# Patient Record
Sex: MALE | Race: WHITE | Employment: FULL TIME | URBAN - NONMETROPOLITAN AREA
[De-identification: names, ages, dates, MRNs, and addresses within clinical notes are randomized per-mention and may not be internally consistent; named-entity substitution may affect disease eponyms.]

---

## 2021-12-15 ENCOUNTER — HOSPITAL ENCOUNTER (EMERGENCY)
Age: 21
Discharge: HOME | End: 2021-12-15
Payer: COMMERCIAL

## 2021-12-15 VITALS
SYSTOLIC BLOOD PRESSURE: 112 MMHG | BODY MASS INDEX: 23.8 KG/M2 | HEART RATE: 97 BPM | HEIGHT: 71 IN | WEIGHT: 170 LBS | DIASTOLIC BLOOD PRESSURE: 58 MMHG | TEMPERATURE: 97.7 F | OXYGEN SATURATION: 96 % | RESPIRATION RATE: 18 BRPM

## 2021-12-15 DIAGNOSIS — J02.9 ACUTE PHARYNGITIS, UNSPECIFIED ETIOLOGY: Primary | ICD-10-CM

## 2021-12-15 DIAGNOSIS — Z20.822 COVID-19 RULED OUT BY LABORATORY TESTING: ICD-10-CM

## 2021-12-15 LAB
GROUP A STREP CULTURE, REFLEX: NEGATIVE
REFLEX THROAT C + S: NORMAL
SARS-COV-2, NAA: NOT  DETECTED

## 2021-12-15 PROCEDURE — 87635 SARS-COV-2 COVID-19 AMP PRB: CPT

## 2021-12-15 PROCEDURE — 99202 OFFICE O/P NEW SF 15 MIN: CPT | Performed by: NURSE PRACTITIONER

## 2021-12-15 PROCEDURE — 87880 STREP A ASSAY W/OPTIC: CPT

## 2021-12-15 PROCEDURE — 99203 OFFICE O/P NEW LOW 30 MIN: CPT

## 2021-12-15 PROCEDURE — 87070 CULTURE OTHR SPECIMN AEROBIC: CPT

## 2021-12-15 RX ORDER — CALCIUM CARBONATE 260MG(650)
TABLET,CHEWABLE ORAL
Refills: 0 | COMMUNITY
Start: 2021-12-15 | End: 2022-03-12

## 2021-12-15 RX ORDER — ASCORBIC ACID/MULTIVIT-MIN 1000 MG
EFFERVESCENT POWDER IN PACKET ORAL
Refills: 0 | COMMUNITY
Start: 2021-12-15 | End: 2022-03-12

## 2021-12-15 RX ORDER — DIPHENHYDRAMINE HYDROCHLORIDE AND LIDOCAINE HYDROCHLORIDE AND ALUMINUM HYDROXIDE AND MAGNESIUM HYDRO
5 KIT 4 TIMES DAILY PRN
Qty: 120 ML | Refills: 0 | Status: SHIPPED | OUTPATIENT
Start: 2021-12-15 | End: 2022-03-12

## 2021-12-15 RX ORDER — ACETAMINOPHEN 325 MG/1
650 TABLET ORAL EVERY 6 HOURS PRN
Qty: 120 TABLET | Refills: 3 | COMMUNITY
Start: 2021-12-15 | End: 2022-09-20

## 2021-12-15 RX ORDER — BROMPHENIRAMINE MALEATE, PSEUDOEPHEDRINE HYDROCHLORIDE, AND DEXTROMETHORPHAN HYDROBROMIDE 2; 30; 10 MG/5ML; MG/5ML; MG/5ML
5 SYRUP ORAL 4 TIMES DAILY PRN
Qty: 65 ML | Refills: 0 | Status: SHIPPED | OUTPATIENT
Start: 2021-12-15 | End: 2022-03-12

## 2021-12-15 ASSESSMENT — ENCOUNTER SYMPTOMS
CHOKING: 0
STRIDOR: 0
SHORTNESS OF BREATH: 0
APNEA: 0
CHEST TIGHTNESS: 0
SWOLLEN GLANDS: 0
NAUSEA: 0
DIARRHEA: 0
VOMITING: 0
SINUS PAIN: 0
SORE THROAT: 1
COUGH: 0
ABDOMINAL PAIN: 0
WHEEZING: 0
RHINORRHEA: 1
SINUS PRESSURE: 1

## 2021-12-15 ASSESSMENT — PAIN DESCRIPTION - DESCRIPTORS: DESCRIPTORS: ACHING

## 2021-12-15 ASSESSMENT — PAIN DESCRIPTION - PAIN TYPE: TYPE: ACUTE PAIN

## 2021-12-15 ASSESSMENT — PAIN DESCRIPTION - PROGRESSION: CLINICAL_PROGRESSION: NOT CHANGED

## 2021-12-15 ASSESSMENT — PAIN DESCRIPTION - FREQUENCY: FREQUENCY: CONTINUOUS

## 2021-12-15 ASSESSMENT — PAIN DESCRIPTION - ONSET: ONSET: ON-GOING

## 2021-12-15 ASSESSMENT — PAIN DESCRIPTION - LOCATION: LOCATION: HEAD;THROAT

## 2021-12-15 ASSESSMENT — PAIN SCALES - GENERAL: PAINLEVEL_OUTOF10: 6

## 2021-12-15 NOTE — ED PROVIDER NOTES
[place] [id number]  Urgent Care Encounter      CHIEF COMPLAINT       Chief Complaint   Patient presents with    Headache    Pharyngitis    Nasal Congestion       Nurses Notes reviewed and I agree except as noted in the HPI. HISTORY OFPRESENT ILLNESS   Maryan Ann is a 22 y.o. The history is provided by the patient. No  was used. Mouth Lesions  Location:  Oropharynx  Quality:  Red and multiple  Onset quality:  Sudden  Duration:  2 days  Progression:  Worsening  Chronicity:  New  Context: possible infection    Context: not a change in diet, not a change in medications, not medications, not stress and not trauma    Relieved by:  Nothing  Worsened by:  Nothing  Ineffective treatments:  None tried  Associated symptoms: congestion, rhinorrhea and sore throat    Associated symptoms: no dental pain, no ear pain, no fever, no malaise, no neck pain, no rash and no swollen glands        REVIEW OF SYSTEMS     Review of Systems   Constitutional: Negative for activity change, appetite change, chills, diaphoresis, fatigue and fever. HENT: Positive for congestion, mouth sores, rhinorrhea, sinus pressure and sore throat. Negative for ear pain, nosebleeds, postnasal drip, sinus pain and sneezing. Respiratory: Negative for apnea, cough, choking, chest tightness, shortness of breath, wheezing and stridor. Cardiovascular: Negative for chest pain, palpitations and leg swelling. Gastrointestinal: Negative for abdominal pain, diarrhea, nausea and vomiting. Musculoskeletal: Negative for neck pain. Skin: Negative for rash. Neurological: Positive for headaches. Negative for dizziness and light-headedness. PAST MEDICAL HISTORY   History reviewed. No pertinent past medical history. SURGICAL HISTORY     Patient  has a past surgical history that includes Eye surgery.     CURRENT MEDICATIONS       Discharge Medication List as of 12/15/2021  4:39 PM          ALLERGIES Patient is has No Known Allergies. FAMILY HISTORY     Patient's family history is not on file. SOCIAL HISTORY     Patient  reports that he has never smoked. He has never used smokeless tobacco. He reports previous alcohol use. He reports that he does not use drugs. PHYSICAL EXAM     ED TRIAGE VITALS  BP: (!) 112/58, Temp: 97.7 °F (36.5 °C), Pulse: 97, Resp: 18, SpO2: 96 %  Physical Exam  Vitals and nursing note reviewed. Constitutional:       General: He is not in acute distress. Appearance: He is well-developed and normal weight. He is not ill-appearing, toxic-appearing or diaphoretic. HENT:      Head: Normocephalic and atraumatic. Right Ear: Tympanic membrane and ear canal normal. No drainage, swelling or tenderness. No middle ear effusion. Tympanic membrane is not erythematous. Left Ear: Tympanic membrane and ear canal normal. No drainage, swelling or tenderness. No middle ear effusion. Tympanic membrane is not erythematous. Nose: Congestion and rhinorrhea present. Mouth/Throat:      Mouth: Mucous membranes are moist. No oral lesions. Pharynx: Uvula midline. Pharyngeal swelling and posterior oropharyngeal erythema present. No oropharyngeal exudate or uvula swelling. Tonsils: No tonsillar exudate or tonsillar abscesses. Eyes:      Conjunctiva/sclera: Conjunctivae normal.   Pulmonary:      Effort: Pulmonary effort is normal. No respiratory distress. Breath sounds: Normal breath sounds. No stridor. No wheezing, rhonchi or rales. Chest:      Chest wall: No tenderness. Musculoskeletal:      Cervical back: Normal range of motion. Skin:     General: Skin is warm. Neurological:      General: No focal deficit present. Mental Status: He is alert and oriented to person, place, and time.    Psychiatric:         Mood and Affect: Mood normal.         Behavior: Behavior normal.         DIAGNOSTIC RESULTS   Labs:  Results for orders placed or performed during the hospital encounter of 12/15/21   COVID-19, Rapid   Result Value Ref Range    SARS-CoV-2, LEE NOT  DETECTED NOT DETECTED   STREP A ANTIGEN   Result Value Ref Range    GROUP A STREP CULTURE, REFLEX Negative        IMAGING:  No orders to display     URGENT CARE COURSE:     Vitals:    12/15/21 1555   BP: (!) 112/58   Pulse: 97   Resp: 18   Temp: 97.7 °F (36.5 °C)   TempSrc: Temporal   SpO2: 96%   Weight: 170 lb (77.1 kg)   Height: 5' 11\" (1.803 m)       Medications - No data to display  PROCEDURES:  None  FINAL IMPRESSION      1. Acute pharyngitis, unspecified etiology    2. COVID-19 ruled out by laboratory testing        DISPOSITION/PLAN   Decision To Discharge    Patient has experienced symptoms related to viral pharyngitis for less than a week, including sore throat, trouble swallowing, hoarseness to voice with complication of upper respiratory illness. Patient has oropharyngeal edema and erythema without exudate or tonsillar involvement. Patient was given education on increasing fluids, salt water gargles, use of honey, and resting voice for symptomatic care. Patient states understanding of home care. Patient is agreeable to the treatment plan and will follow up with her primary care provider within the next week or return here or go to the emergency department for any changes or concerns. The patient left without any assistance.     PATIENT REFERRED TO:  [address] [place] [address]. [id number]  Call today  [phone]    DISCHARGE MEDICATIONS:  Discharge Medication List as of 12/15/2021  4:39 PM      START taking these medications    Details   DPH-Lido-AlHydr-MgHydr-Simeth (MAGIC MOUTHWASH) SUSP Swish and spit 5 mLs 4 times daily as needed for Irritation Equal part premix, Disp-120 mL, R-0Normal      brompheniramine-pseudoephedrine-DM (BROMFED DM) 2-30-10 MG/5ML syrup Take 5 mLs by mouth 4 times daily as needed for Congestion or Cough (headache), Disp-65 mL, R-0Normal      acetaminophen (AMINOFEN) 325 MG tablet Take 2 tablets by mouth every 6 hours as needed for Pain, Disp-120 tablet, R-3OTC      Multiple Vitamins-Minerals (EMERGEN-C VITAMIN C) PACK FolLow package directions, R-0OTC      Zinc 10 MG LOZG Follow package directions, R-0OTC           Discharge Medication List as of 12/15/2021  4:39 PM          Nelson Kunz APRN - ANALY Baeza - CNP  12/15/21 9938

## 2021-12-15 NOTE — Clinical Note
Cloyde Schlatter was seen and treated in our emergency department on 12/15/2021. COVID19 virus is suspected. Per the CDC guidelines we recommend home isolation until the following conditions are all met:    1. At least 10 days have passed since symptoms first appeared and  2. At least 24 hours have passed since last fever without the use of fever-reducing medications and  3. Symptoms (e.g., cough, shortness of breath) have improved    If you have any questions or concerns, please don't hesitate to call.     He may return to work/school on 12/24/2021         Ignacio Sargent, APRN - CNP

## 2021-12-15 NOTE — Clinical Note
Charles Land was seen and treated in our emergency department on 12/15/2021. He may return to work on 12/16/2021. If you have any questions or concerns, please don't hesitate to call.       Pat Timmons, ANALY - CNP

## 2021-12-16 ENCOUNTER — HOSPITAL ENCOUNTER (OUTPATIENT)
Age: 21
Setting detail: SPECIMEN
Discharge: HOME | End: 2021-12-16

## 2021-12-17 LAB
SOURCE: NORMAL
TRICHOMONAS VAGINALI, MOLECULAR: NEGATIVE

## 2021-12-18 LAB — THROAT/NOSE CULTURE: NORMAL

## 2021-12-20 LAB
C. TRACHOMATIS DNA ,URINE: ABNORMAL
N. GONORRHOEAE DNA, URINE: NEGATIVE
SPECIMEN DESCRIPTION: ABNORMAL

## 2022-01-12 ENCOUNTER — HOSPITAL ENCOUNTER (OUTPATIENT)
Age: 22
Setting detail: SPECIMEN
Discharge: HOME | End: 2022-01-12

## 2022-01-13 LAB
C. TRACHOMATIS DNA ,URINE: NEGATIVE
SPECIMEN DESCRIPTION: NORMAL

## 2022-01-17 ENCOUNTER — HOSPITAL ENCOUNTER (OUTPATIENT)
Age: 22
Setting detail: SPECIMEN
Discharge: HOME | End: 2022-01-17

## 2022-01-18 LAB
HERPES SIMPLEX VIRUS 1 IGG: 0.85
HERPES SIMPLEX VIRUS 2 IGG: 0.14
HERPES TYPE 1/2 IGM COMBINED: 0.68

## 2022-03-12 ENCOUNTER — HOSPITAL ENCOUNTER (EMERGENCY)
Age: 22
Discharge: HOME | End: 2022-03-12
Payer: COMMERCIAL

## 2022-03-12 VITALS
RESPIRATION RATE: 16 BRPM | TEMPERATURE: 97.8 F | DIASTOLIC BLOOD PRESSURE: 60 MMHG | OXYGEN SATURATION: 98 % | HEART RATE: 59 BPM | SYSTOLIC BLOOD PRESSURE: 123 MMHG

## 2022-03-12 DIAGNOSIS — L60.0 INGROWN TOENAIL: Primary | ICD-10-CM

## 2022-03-12 PROCEDURE — 99213 OFFICE O/P EST LOW 20 MIN: CPT | Performed by: EMERGENCY MEDICINE

## 2022-03-12 PROCEDURE — 99213 OFFICE O/P EST LOW 20 MIN: CPT

## 2022-03-12 RX ORDER — CEPHALEXIN 500 MG/1
500 CAPSULE ORAL 3 TIMES DAILY
Qty: 21 CAPSULE | Refills: 0 | Status: SHIPPED | OUTPATIENT
Start: 2022-03-12 | End: 2022-03-19

## 2022-03-12 ASSESSMENT — ENCOUNTER SYMPTOMS
COUGH: 0
COLOR CHANGE: 0

## 2022-03-12 NOTE — ED PROVIDER NOTES
[facility]  Urgent Care Encounter       CHIEF COMPLAINT       Chief Complaint   Patient presents with    Toe Pain       Nurses Notes reviewed and I agree except as noted in the HPI. HISTORY OF PRESENT ILLNESS   Dieudonne Gallo is a 25 y.o. male who presents for complaints of left great toe pain, redness, swelling. This has been increasing over the past 3 to 5 days. Patient denies any injury to the area. He has not applied any medications to the area for treatment    HPI    REVIEW OF SYSTEMS     Review of Systems   Constitutional: Negative for chills, diaphoresis and fatigue. Respiratory: Negative for cough. Musculoskeletal: Positive for arthralgias (left great toe). Skin: Negative for color change. Neurological: Negative for dizziness. PAST MEDICAL HISTORY   History reviewed. No pertinent past medical history. SURGICALHISTORY     Patient  has a past surgical history that includes Eye surgery. CURRENT MEDICATIONS       Discharge Medication List as of 3/12/2022 12:33 PM      CONTINUE these medications which have NOT CHANGED    Details   acetaminophen (AMINOFEN) 325 MG tablet Take 2 tablets by mouth every 6 hours as needed for Pain, Disp-120 tablet, R-3OTC             ALLERGIES     Patient is has No Known Allergies. Patients   There is no immunization history on file for this patient. FAMILY HISTORY     Patient's family history is not on file. SOCIAL HISTORY     Patient  reports that he has never smoked. He has never used smokeless tobacco. He reports previous alcohol use. He reports that he does not use drugs. PHYSICAL EXAM     ED TRIAGE VITALS  BP: 123/60, Temp: 97.8 °F (36.6 °C), Pulse: 59, Resp: 16, SpO2: 98 %,Estimated body mass index is 23.71 kg/m² as calculated from the following:    Height as of 12/15/21: 5' 11\" (1.803 m). Weight as of 12/15/21: 170 lb (77.1 kg). ,No LMP for male patient.     Physical Exam  Constitutional:       General: He is not in acute distress. Appearance: He is normal weight. HENT:      Head: Normocephalic. Mouth/Throat:      Mouth: Mucous membranes are moist.   Cardiovascular:      Rate and Rhythm: Normal rate. Pulmonary:      Effort: Pulmonary effort is normal.   Musculoskeletal:        Feet:       Comments: Redness, swelling, tenderness along the lateral aspect of the great toe related to ingrown toenail Redness remains in distal phalanx   Skin:     General: Skin is warm and dry. Neurological:      Mental Status: He is alert. DIAGNOSTIC RESULTS     Labs:No results found for this visit on 03/12/22. IMAGING:    No orders to display         EKG:      URGENT CARE COURSE:     Vitals:    03/12/22 1221   BP: 123/60   Pulse: 59   Resp: 16   Temp: 97.8 °F (36.6 °C)   TempSrc: Temporal   SpO2: 98%       Medications - No data to display         PROCEDURES:  None    FINAL IMPRESSION      1. Ingrown toenail          DISPOSITION/ PLAN   Presents for ingrown toenail of the left great toe. Will place patient on Keflex as this appears to be infected. Bactroban ointment placed to the area 3 times daily. Epsom salt soaks 3 times daily for 5 to 10 minutes. Advise follow-up with podiatry if no improvement 3 to 5 days. He is given the number for on-call podiatrist, Dr. Elda Baez to make an appointment if no improvement. Advised to wear shoes with plenty of toe room. Return for new or worsening symptoms        PATIENT REFERRED TO:  No primary care provider on file. No primary physician on file. DISCHARGE MEDICATIONS:  Discharge Medication List as of 3/12/2022 12:33 PM      START taking these medications    Details   cephALEXin (KEFLEX) 500 MG capsule Take 1 capsule by mouth 3 times daily for 7 days, Disp-21 capsule, R-0Normal      mupirocin (BACTROBAN) 2 % ointment Apply topically 3 times daily. , Disp-1 g, R-0, Normal             Discharge Medication List as of 3/12/2022 12:33 PM      STOP taking these medications DPH-Lido-AlHydr-MgHydr-Simeth (MAGIC MOUTHWASH) SUSP Comments:   Reason for Stopping:         brompheniramine-pseudoephedrine-DM (BROMFED DM) 2-30-10 MG/5ML syrup Comments:   Reason for Stopping:         Multiple Vitamins-Minerals (EMERGEN-C VITAMIN C) PACK Comments:   Reason for Stopping:         Zinc 10 MG LOZG Comments:   Reason for Stopping:               Discharge Medication List as of 3/12/2022 12:33 PM          ANALY Lira CNP    (Please note that portions of this note were completed with a voice recognition program. Efforts were made to edit the dictations but occasionally words are mis-transcribed.)          ANALY Lira CNP  03/12/22 1300

## 2022-07-11 ENCOUNTER — HOSPITAL ENCOUNTER (EMERGENCY)
Age: 22
Discharge: HOME | End: 2022-07-11
Payer: COMMERCIAL

## 2022-07-11 VITALS
OXYGEN SATURATION: 98 % | WEIGHT: 160 LBS | HEART RATE: 69 BPM | TEMPERATURE: 98.3 F | BODY MASS INDEX: 22.4 KG/M2 | SYSTOLIC BLOOD PRESSURE: 123 MMHG | DIASTOLIC BLOOD PRESSURE: 72 MMHG | RESPIRATION RATE: 18 BRPM | HEIGHT: 71 IN

## 2022-07-11 DIAGNOSIS — T63.481A LOCAL REACTION TO INSECT STING, ACCIDENTAL OR UNINTENTIONAL, INITIAL ENCOUNTER: Primary | ICD-10-CM

## 2022-07-11 PROCEDURE — 99213 OFFICE O/P EST LOW 20 MIN: CPT | Performed by: EMERGENCY MEDICINE

## 2022-07-11 PROCEDURE — 99213 OFFICE O/P EST LOW 20 MIN: CPT

## 2022-07-11 RX ORDER — PREDNISONE 20 MG/1
40 TABLET ORAL DAILY
Qty: 10 TABLET | Refills: 0 | Status: SHIPPED | OUTPATIENT
Start: 2022-07-11 | End: 2022-07-16

## 2022-07-11 RX ORDER — CEPHALEXIN 500 MG/1
500 CAPSULE ORAL 3 TIMES DAILY
Qty: 21 CAPSULE | Refills: 0 | Status: SHIPPED | OUTPATIENT
Start: 2022-07-11 | End: 2022-09-20

## 2022-07-11 RX ORDER — DIPHENHYDRAMINE HCL 25 MG
25 TABLET ORAL EVERY 6 HOURS PRN
COMMUNITY
End: 2022-09-20

## 2022-07-11 ASSESSMENT — PAIN SCALES - GENERAL: PAINLEVEL_OUTOF10: 5

## 2022-07-11 ASSESSMENT — PAIN - FUNCTIONAL ASSESSMENT: PAIN_FUNCTIONAL_ASSESSMENT: 0-10

## 2022-07-11 ASSESSMENT — PAIN DESCRIPTION - PAIN TYPE: TYPE: ACUTE PAIN

## 2022-07-11 ASSESSMENT — PAIN DESCRIPTION - LOCATION: LOCATION: ARM

## 2022-07-11 ASSESSMENT — PAIN DESCRIPTION - DESCRIPTORS: DESCRIPTORS: SQUEEZING;STABBING

## 2022-07-11 ASSESSMENT — PAIN DESCRIPTION - FREQUENCY: FREQUENCY: CONTINUOUS

## 2022-07-11 ASSESSMENT — ENCOUNTER SYMPTOMS
COUGH: 0
SHORTNESS OF BREATH: 0

## 2022-07-11 ASSESSMENT — PAIN DESCRIPTION - ORIENTATION: ORIENTATION: LEFT

## 2022-07-11 ASSESSMENT — PAIN DESCRIPTION - ONSET: ONSET: ON-GOING

## 2022-07-11 NOTE — ED TRIAGE NOTES
To room with c/o sting yesterday left forearm. Today he awoke with left arm moderate swelling, pain, and itching.

## 2022-07-11 NOTE — ED NOTES
No change in patients condition. Discharge instructions and prescriptions discussed with pt. Pt. Verbalized understanding of info given and ambulated to exit in stable condition.       Pauly Traore RN  07/11/22 7982

## 2022-07-11 NOTE — ED PROVIDER NOTES
[facility]  Urgent Care Encounter       CHIEF COMPLAINT       Chief Complaint   Patient presents with    Insect Bite       Nurses Notes reviewed and I agree except as noted in the HPI. HISTORY OF PRESENT ILLNESS   Moiz Mckinnon is a 25 y.o. male who presents for complaints of redness, swelling and tenderness to the left forearm. Patient was stung by a wasp yesterday. He states the area became swollen and he did take Benadryl. When he woke this morning the area was significantly swollen, red and tender to touch. No cough, chest pain, wheezing or shortness of breath. HPI    REVIEW OF SYSTEMS     Review of Systems   Constitutional: Negative for chills, fatigue and fever. Respiratory: Negative for cough and shortness of breath. Skin: Positive for wound (left forearm). Neurological: Negative for dizziness. Psychiatric/Behavioral: Negative for behavioral problems. PAST MEDICAL HISTORY   History reviewed. No pertinent past medical history. SURGICALHISTORY     Patient  has a past surgical history that includes Eye surgery. CURRENT MEDICATIONS       Discharge Medication List as of 7/11/2022 12:58 PM      CONTINUE these medications which have NOT CHANGED    Details   diphenhydrAMINE (BENADRYL) 25 MG tablet Take 25 mg by mouth every 6 hours as needed for ItchingHistorical Med      acetaminophen (AMINOFEN) 325 MG tablet Take 2 tablets by mouth every 6 hours as needed for Pain, Disp-120 tablet, R-3OTC             ALLERGIES     Patient is has No Known Allergies. Patients   There is no immunization history on file for this patient. FAMILY HISTORY     Patient's family history is not on file. SOCIAL HISTORY     Patient  reports that he has never smoked. He has never used smokeless tobacco. He reports current alcohol use. He reports that he does not use drugs.     PHYSICAL EXAM     ED TRIAGE VITALS  BP: 123/72, Temp: 98.3 °F (36.8 °C), Heart Rate: 69, Resp: 18, SpO2: 98 %,Estimated body mass index is 22.32 kg/m² as calculated from the following:    Height as of this encounter: 5' 11\" (1.803 m). Weight as of this encounter: 160 lb (72.6 kg). ,No LMP for male patient. Physical Exam  Constitutional:       General: He is not in acute distress. Appearance: He is normal weight. He is not ill-appearing. HENT:      Mouth/Throat:      Mouth: Mucous membranes are moist.   Cardiovascular:      Rate and Rhythm: Normal rate. Pulmonary:      Effort: Pulmonary effort is normal.   Musculoskeletal:        Arms:       Comments: Blanchable erythema, swelling to the majority of the dorsal surface of the left forearm. There is a small puncture consistent with wasp sting to the mid forearm. Skin:     General: Skin is warm and dry. Capillary Refill: Capillary refill takes less than 2 seconds. Neurological:      General: No focal deficit present. Mental Status: He is alert. Psychiatric:         Mood and Affect: Mood normal.         DIAGNOSTIC RESULTS     Labs:No results found for this visit on 07/11/22. IMAGING:    No orders to display         EKG:      URGENT CARE COURSE:     Vitals:    07/11/22 1240   BP: 123/72   Pulse: 69   Resp: 18   Temp: 98.3 °F (36.8 °C)   TempSrc: Temporal   SpO2: 98%   Weight: 160 lb (72.6 kg)   Height: 5' 11\" (1.803 m)       Medications - No data to display         PROCEDURES:  None    FINAL IMPRESSION      1. Local reaction to insect sting, accidental or unintentional, initial encounter          DISPOSITION/ PLAN     Patient presents for what is likely a local reaction to a wasp sting. Swelling to the entire surface of the left dorsal forearm. We will treat with prednisone and patient will take Benadryl at home. Cool compresses. I did place patient on Keflex in case there is a secondary infection and cellulitis is beginning. Advised to take as directed.   Return for increased swelling, shortness of breath, temperature 100.4 or greater, or any new concerns. PATIENT REFERRED TO:  No primary care provider on file. No primary physician on file.       DISCHARGE MEDICATIONS:  Discharge Medication List as of 7/11/2022 12:58 PM      START taking these medications    Details   predniSONE (DELTASONE) 20 MG tablet Take 2 tablets by mouth daily for 5 days, Disp-10 tablet, R-0Normal      cephALEXin (KEFLEX) 500 MG capsule Take 1 capsule by mouth 3 times daily, Disp-21 capsule, R-0Normal             Discharge Medication List as of 7/11/2022 12:58 PM          Discharge Medication List as of 7/11/2022 12:58 PM          ANALY Davis CNP    (Please note that portions of this note were completed with a voice recognition program. Efforts were made to edit the dictations but occasionally words are mis-transcribed.)          ANALY Davis CNP  07/11/22 8718

## 2022-07-31 ENCOUNTER — HOSPITAL ENCOUNTER (EMERGENCY)
Age: 22
Discharge: HOME | End: 2022-07-31
Payer: COMMERCIAL

## 2022-07-31 VITALS
DIASTOLIC BLOOD PRESSURE: 72 MMHG | HEART RATE: 99 BPM | RESPIRATION RATE: 16 BRPM | HEIGHT: 71 IN | SYSTOLIC BLOOD PRESSURE: 122 MMHG | BODY MASS INDEX: 22.4 KG/M2 | OXYGEN SATURATION: 97 % | TEMPERATURE: 99.2 F | WEIGHT: 160 LBS

## 2022-07-31 DIAGNOSIS — J02.9 VIRAL PHARYNGITIS: Primary | ICD-10-CM

## 2022-07-31 DIAGNOSIS — B34.9 VIRAL ILLNESS: ICD-10-CM

## 2022-07-31 LAB
GROUP A STREP CULTURE, REFLEX: NEGATIVE
SARS-COV-2, NAA: NOT  DETECTED

## 2022-07-31 PROCEDURE — 87070 CULTURE OTHR SPECIMN AEROBIC: CPT

## 2022-07-31 PROCEDURE — 87635 SARS-COV-2 COVID-19 AMP PRB: CPT

## 2022-07-31 PROCEDURE — 87880 STREP A ASSAY W/OPTIC: CPT

## 2022-07-31 PROCEDURE — 99213 OFFICE O/P EST LOW 20 MIN: CPT

## 2022-07-31 PROCEDURE — 99213 OFFICE O/P EST LOW 20 MIN: CPT | Performed by: EMERGENCY MEDICINE

## 2022-07-31 ASSESSMENT — ENCOUNTER SYMPTOMS
DIARRHEA: 0
VOMITING: 0
COLOR CHANGE: 0
COUGH: 0
SHORTNESS OF BREATH: 0
SORE THROAT: 1
NAUSEA: 0
ABDOMINAL PAIN: 0

## 2022-07-31 ASSESSMENT — PAIN DESCRIPTION - LOCATION: LOCATION: HEAD

## 2022-07-31 ASSESSMENT — PAIN DESCRIPTION - PAIN TYPE: TYPE: ACUTE PAIN

## 2022-07-31 ASSESSMENT — PAIN DESCRIPTION - ORIENTATION: ORIENTATION: ANTERIOR

## 2022-07-31 ASSESSMENT — PAIN - FUNCTIONAL ASSESSMENT: PAIN_FUNCTIONAL_ASSESSMENT: 0-10

## 2022-07-31 ASSESSMENT — PAIN DESCRIPTION - DESCRIPTORS: DESCRIPTORS: ACHING

## 2022-07-31 ASSESSMENT — PAIN SCALES - GENERAL: PAINLEVEL_OUTOF10: 6

## 2022-07-31 ASSESSMENT — PAIN DESCRIPTION - ONSET: ONSET: GRADUAL

## 2022-07-31 NOTE — ED PROVIDER NOTES
[facility]  Urgent Care Encounter       CHIEF COMPLAINT       Chief Complaint   Patient presents with    Illness     Symptoms- Headache, fever (101), chills, sore throat, body aches  Started 7/30 at 1600        Nurses Notes reviewed and I agree except as noted in the HPI. HISTORY OF PRESENT ILLNESS   Zuly Christine is a 25 y.o. male who presents for complaints of sore throat, 101 fever last evening, chills, body aches, headache. Symptoms began yesterday afternoon. He denies any exposure to COVID-19. He denies a cough or shortness of breath. HPI    REVIEW OF SYSTEMS     Review of Systems   Constitutional:  Positive for activity change, chills, fatigue and fever. HENT:  Positive for congestion and sore throat. Respiratory:  Negative for cough and shortness of breath. Gastrointestinal:  Negative for abdominal pain, diarrhea, nausea and vomiting. Skin:  Negative for color change. Neurological:  Positive for headaches. Psychiatric/Behavioral:  Negative for behavioral problems. PAST MEDICAL HISTORY   History reviewed. No pertinent past medical history. SURGICALHISTORY     Patient  has a past surgical history that includes Eye surgery. CURRENT MEDICATIONS       Previous Medications    ACETAMINOPHEN (AMINOFEN) 325 MG TABLET    Take 2 tablets by mouth every 6 hours as needed for Pain    CEPHALEXIN (KEFLEX) 500 MG CAPSULE    Take 1 capsule by mouth 3 times daily    DIPHENHYDRAMINE (BENADRYL) 25 MG TABLET    Take 25 mg by mouth every 6 hours as needed for Itching       ALLERGIES     Patient is has No Known Allergies. Patients   There is no immunization history on file for this patient. FAMILY HISTORY     Patient's family history is not on file. SOCIAL HISTORY     Patient  reports that he has never smoked. He has never used smokeless tobacco. He reports current alcohol use. He reports that he does not use drugs.     PHYSICAL EXAM     ED TRIAGE VITALS  BP: 122/72, Temp: 99.2 °F (37.3 °C), Heart Rate: 99, Resp: 16, SpO2: 97 %,Estimated body mass index is 22.32 kg/m² as calculated from the following:    Height as of this encounter: 5' 11\" (1.803 m). Weight as of this encounter: 160 lb (72.6 kg). ,No LMP for male patient. Physical Exam  Constitutional:       Appearance: He is ill-appearing. He is not toxic-appearing. HENT:      Head: Normocephalic and atraumatic. Nose: Congestion present. Mouth/Throat:      Mouth: Mucous membranes are moist.      Pharynx: Posterior oropharyngeal erythema present. Tonsils: Tonsillar exudate present. 1+ on the right. 1+ on the left. Cardiovascular:      Rate and Rhythm: Normal rate and regular rhythm. Pulses: Normal pulses. Heart sounds: Normal heart sounds. Pulmonary:      Effort: Pulmonary effort is normal.      Breath sounds: Normal breath sounds. Skin:     General: Skin is warm and dry. Neurological:      General: No focal deficit present. Mental Status: He is alert. Psychiatric:         Mood and Affect: Mood normal.         Behavior: Behavior normal.       DIAGNOSTIC RESULTS     Labs:  Results for orders placed or performed during the hospital encounter of 07/31/22   COVID-19, Rapid   Result Value Ref Range    SARS-CoV-2, LEE NOT  DETECTED NOT DETECTED   STREP A ANTIGEN   Result Value Ref Range    GROUP A STREP CULTURE, REFLEX Negative        IMAGING:    No orders to display         EKG:      URGENT CARE COURSE:     Vitals:    07/31/22 1420   BP: 122/72   Pulse: 99   Resp: 16   Temp: 99.2 °F (37.3 °C)   TempSrc: Temporal   SpO2: 97%   Weight: 160 lb (72.6 kg)   Height: 5' 11\" (1.803 m)       Medications - No data to display         PROCEDURES:  None    FINAL IMPRESSION      1. Viral pharyngitis    2. Viral illness          DISPOSITION/ PLAN   Patient presents for his likely viral illness, viral pharyngitis.   Patient is advised to retest for COVID-19 tomorrow if symptoms have not significantly improved as he may have tested too early for a positive result. Salt water gargles, ibuprofen/Tylenol as needed. Drink plenty of fluids. Return for new or worsening symptoms. PATIENT REFERRED TO:  No primary care provider on file. No primary physician on file.       DISCHARGE MEDICATIONS:  New Prescriptions    No medications on file       Discontinued Medications    No medications on file       Current Discharge Medication List          ANALY Jose CNP    (Please note that portions of this note were completed with a voice recognition program. Efforts were made to edit the dictations but occasionally words are mis-transcribed.)           ANALY Jose CNP  07/31/22 1689

## 2022-07-31 NOTE — ED TRIAGE NOTES
Arrives to [facility] for the evaluation of body aches, headache, fever, chills and sore throat that started yesterday. Patient states a couple nights ago camped out with a friend, temp was cold. Also did some fishing where he waded in cold water. Temp at home yesterday was 101. Has not taken any medication today for aches and elevated temp. Temp on arrival 99.2 (T). Respirations unlabored. No cough. Waiting Gerardo Esther, NP to assess.

## 2022-07-31 NOTE — DISCHARGE INSTRUCTIONS
Salt water gargles may be helpful    Tylenol/ibuprofen as needed    I recommend retesting for COVID-19 tomorrow if your symptoms have not significantly improved    Return for new or worsening symptoms

## 2022-08-01 ENCOUNTER — HOSPITAL ENCOUNTER (EMERGENCY)
Age: 22
Discharge: HOME | End: 2022-08-01
Payer: COMMERCIAL

## 2022-08-01 VITALS
RESPIRATION RATE: 18 BRPM | DIASTOLIC BLOOD PRESSURE: 52 MMHG | SYSTOLIC BLOOD PRESSURE: 116 MMHG | HEART RATE: 100 BPM | TEMPERATURE: 98 F | OXYGEN SATURATION: 100 %

## 2022-08-01 DIAGNOSIS — Z20.822 ENCOUNTER FOR LABORATORY TESTING FOR COVID-19 VIRUS: ICD-10-CM

## 2022-08-01 DIAGNOSIS — J02.9 VIRAL PHARYNGITIS: Primary | ICD-10-CM

## 2022-08-01 DIAGNOSIS — Z72.51 HIGH RISK HETEROSEXUAL BEHAVIOR: ICD-10-CM

## 2022-08-01 LAB
CHLAMYDIA TRACHOMATIS BY RT-PCR: NOT DETECTED
CT/NG SOURCE: NORMAL
INFLUENZA A: NOT DETECTED
INFLUENZA B: NOT DETECTED
NEISSERIA GONORRHOEAE BY RT-PCR: NOT DETECTED
REFLEX THROAT C + S: NORMAL
SARS-COV-2 RNA, RT PCR: NOT DETECTED

## 2022-08-01 PROCEDURE — 99213 OFFICE O/P EST LOW 20 MIN: CPT

## 2022-08-01 PROCEDURE — 87491 CHLMYD TRACH DNA AMP PROBE: CPT

## 2022-08-01 PROCEDURE — 99213 OFFICE O/P EST LOW 20 MIN: CPT | Performed by: NURSE PRACTITIONER

## 2022-08-01 PROCEDURE — 87636 SARSCOV2 & INF A&B AMP PRB: CPT

## 2022-08-01 PROCEDURE — 87591 N.GONORRHOEAE DNA AMP PROB: CPT

## 2022-08-01 ASSESSMENT — ENCOUNTER SYMPTOMS
SORE THROAT: 1
DIARRHEA: 0
EYE DISCHARGE: 0
ABDOMINAL PAIN: 0
RHINORRHEA: 1
BACK PAIN: 0
SHORTNESS OF BREATH: 0
TROUBLE SWALLOWING: 0
EYE REDNESS: 0
NAUSEA: 0
VOMITING: 0
COUGH: 0

## 2022-08-01 NOTE — Clinical Note
Ilia Bill was seen and treated in our emergency department on 8/1/2022. He may return to work on 08/03/2022. He may return sooner pending results. If you have any questions or concerns, please don't hesitate to call.       Blue Kumar, ANALY - CNP

## 2022-08-01 NOTE — ED PROVIDER NOTES
[address] Encounter      [address] [facility]       Chief Complaint   Patient presents with    Covid Testing    Other     Std testing         Nurses Notes reviewed and I agree except as noted in the HPI. HISTORY OF PRESENT ILLNESS   Ilia Bill is a 25 y.o. male who presents for evaluation of persistent sore throat and STD testing. Onset of sore throat 7/30/2022. Patient was evaluated and treated at this location yesterday. Negative for strep and COVID-19. Requesting repeat COVID-19 testing. Preliminary throat culture shows normal olu. He would also like STD testing. Patient is sexually active without protection. History of chlamydia infection. Currently asymptomatic. He has been not told recently that he has been exposed to an STD. No current treatment. REVIEW OF SYSTEMS     Review of Systems   Constitutional:  Negative for chills, diaphoresis, fatigue and fever. HENT:  Positive for postnasal drip, rhinorrhea and sore throat. Negative for congestion, ear pain and trouble swallowing. Eyes:  Negative for discharge and redness. Respiratory:  Negative for cough and shortness of breath. Cardiovascular:  Negative for chest pain. Gastrointestinal:  Negative for abdominal pain, diarrhea, nausea and vomiting. Genitourinary:  Negative for decreased urine volume, difficulty urinating, dysuria, flank pain, frequency, genital sores, hematuria, penile discharge, penile pain, penile swelling, scrotal swelling, testicular pain and urgency. Musculoskeletal:  Positive for myalgias. Negative for back pain, neck pain and neck stiffness. Skin:  Negative for rash. Neurological:  Negative for headaches. Hematological:  Negative for adenopathy. Psychiatric/Behavioral:  Negative for sleep disturbance. PAST MEDICAL HISTORY   History reviewed. No pertinent past medical history.     SURGICAL HISTORY     Patient  has a past surgical history that includes friction rub. No gallop. Pulmonary:      Effort: Pulmonary effort is normal. No accessory muscle usage or respiratory distress. Breath sounds: Normal breath sounds. Chest:   Breasts:     Right: No supraclavicular adenopathy. Left: No supraclavicular adenopathy. Musculoskeletal:      Cervical back: Normal range of motion and neck supple. Lymphadenopathy:      Head:      Right side of head: No submental, submandibular, tonsillar, preauricular, posterior auricular or occipital adenopathy. Left side of head: No submental, submandibular, tonsillar, preauricular, posterior auricular or occipital adenopathy. Cervical: No cervical adenopathy. Upper Body:      Right upper body: No supraclavicular adenopathy. Left upper body: No supraclavicular adenopathy. Skin:     General: Skin is warm and dry. Coloration: Skin is not pale. Findings: No rash. Comments: Skin intact, warm and dry to touch, no rashes noted on exposed surfaces. Neurological:      Mental Status: He is alert and oriented to person, place, and time. He is not disoriented. Psychiatric:         Mood and Affect: Mood normal.         Behavior: Behavior is cooperative. DIAGNOSTIC RESULTS   Labs: No results found for this visit on 08/01/22. IMAGING:  No orders to display     URGENT CARE COURSE:     Vitals:    08/01/22 1310   BP: (!) 116/52   Pulse: 100   Resp: 18   Temp: 98 °F (36.7 °C)   SpO2: 100%       Medications - No data to display  PROCEDURES:  None  FINALIMPRESSION      1. Viral pharyngitis    2. High risk heterosexual behavior    3. Encounter for laboratory testing for COVID-19 virus        DISPOSITION/PLAN   DISPOSITION Decision To Discharge 08/01/2022 01:28:28 PM  Nontoxic, no distress. Follow-up throat culture pending. Sore throat secondary to postnasal drainage. Increase fluids, rest.  Pending STD results. COVID results pending. If symptoms worsen return or go to ER.   PATIENT REFERRED

## 2022-08-01 NOTE — Clinical Note
Jazlyn Maher was seen and treated in our emergency department on 8/1/2022. He may return to school on 08/03/2022. He may return sooner pending results. If you have any questions or concerns, please don't hesitate to call.       Darren Jauregui, ANALY - CNP

## 2022-08-02 LAB — THROAT/NOSE CULTURE: NORMAL

## 2022-08-15 ENCOUNTER — HOSPITAL ENCOUNTER (EMERGENCY)
Age: 22
Discharge: HOME | End: 2022-08-15
Payer: COMMERCIAL

## 2022-08-15 VITALS
OXYGEN SATURATION: 98 % | BODY MASS INDEX: 22.4 KG/M2 | HEIGHT: 71 IN | SYSTOLIC BLOOD PRESSURE: 128 MMHG | HEART RATE: 105 BPM | RESPIRATION RATE: 16 BRPM | TEMPERATURE: 98.1 F | WEIGHT: 160 LBS | DIASTOLIC BLOOD PRESSURE: 83 MMHG

## 2022-08-15 DIAGNOSIS — K12.1 STOMATITIS: Primary | ICD-10-CM

## 2022-08-15 LAB
GROUP A STREP CULTURE, REFLEX: NEGATIVE
REFLEX THROAT C + S: NORMAL

## 2022-08-15 PROCEDURE — 99213 OFFICE O/P EST LOW 20 MIN: CPT | Performed by: EMERGENCY MEDICINE

## 2022-08-15 PROCEDURE — 87880 STREP A ASSAY W/OPTIC: CPT

## 2022-08-15 PROCEDURE — 99213 OFFICE O/P EST LOW 20 MIN: CPT

## 2022-08-15 PROCEDURE — 87070 CULTURE OTHR SPECIMN AEROBIC: CPT

## 2022-08-15 RX ORDER — DEXAMETHASONE 0.5 MG/5ML
0.5 ELIXIR ORAL 3 TIMES DAILY
Qty: 75 ML | Refills: 0 | Status: SHIPPED | OUTPATIENT
Start: 2022-08-15 | End: 2022-08-20

## 2022-08-15 ASSESSMENT — ENCOUNTER SYMPTOMS
TROUBLE SWALLOWING: 0
SORE THROAT: 1
RHINORRHEA: 0
COUGH: 0

## 2022-08-15 ASSESSMENT — PAIN DESCRIPTION - DESCRIPTORS: DESCRIPTORS: SORE;DISCOMFORT

## 2022-08-15 ASSESSMENT — PAIN SCALES - GENERAL: PAINLEVEL_OUTOF10: 4

## 2022-08-15 ASSESSMENT — PAIN - FUNCTIONAL ASSESSMENT: PAIN_FUNCTIONAL_ASSESSMENT: 0-10

## 2022-08-15 ASSESSMENT — PAIN DESCRIPTION - LOCATION: LOCATION: THROAT

## 2022-08-15 ASSESSMENT — PAIN DESCRIPTION - FREQUENCY: FREQUENCY: CONTINUOUS

## 2022-08-15 ASSESSMENT — PAIN DESCRIPTION - ONSET: ONSET: ON-GOING

## 2022-08-15 ASSESSMENT — PAIN DESCRIPTION - PAIN TYPE: TYPE: ACUTE PAIN

## 2022-08-15 NOTE — ED PROVIDER NOTES
[facility]  Urgent Care Encounter       CHIEF COMPLAINT       Chief Complaint   Patient presents with    Pharyngitis       Nurses Notes reviewed and I agree except as noted in the HPI. HISTORY OF PRESENT ILLNESS   Samantha Stanton is a 25 y.o. male who presents complaints of sore throat. Sore throat began approximately 3 to 4 days ago. He was seen here a month ago for similar sore throat that he states did resolve. He has noticed some small bumps to the back of his throat. States it is painful to swallow. No fevers. No body aches or chills. No cough. HPI    REVIEW OF SYSTEMS     Review of Systems   Constitutional:  Negative for activity change, fatigue and fever. HENT:  Positive for sore throat. Negative for congestion, postnasal drip, rhinorrhea and trouble swallowing. Respiratory:  Negative for cough. Neurological:  Negative for dizziness and headaches. PAST MEDICAL HISTORY   History reviewed. No pertinent past medical history. SURGICALHISTORY     Patient  has a past surgical history that includes Eye surgery. CURRENT MEDICATIONS       Discharge Medication List as of 8/15/2022  6:15 PM        CONTINUE these medications which have NOT CHANGED    Details   DM-Doxylamine-Acetaminophen (NYQUIL HBP COLD & FLU) 46-4. MG/15ML LIQD Take by mouthHistorical Med      diphenhydrAMINE (BENADRYL) 25 MG tablet Take 25 mg by mouth every 6 hours as needed for ItchingHistorical Med      cephALEXin (KEFLEX) 500 MG capsule Take 1 capsule by mouth 3 times daily, Disp-21 capsule, R-0Normal      acetaminophen (AMINOFEN) 325 MG tablet Take 2 tablets by mouth every 6 hours as needed for Pain, Disp-120 tablet, R-3OTC             ALLERGIES     Patient is has No Known Allergies. Patients   There is no immunization history on file for this patient. FAMILY HISTORY     Patient's family history is not on file. SOCIAL HISTORY     Patient  reports that he has never smoked.  He has never used smokeless tobacco. He reports current alcohol use. He reports that he does not use drugs. PHYSICAL EXAM     ED TRIAGE VITALS  BP: 128/83, Temp: 98.1 °F (36.7 °C), Heart Rate: (!) 105, Resp: 16, SpO2: 98 %,Estimated body mass index is 22.32 kg/m² as calculated from the following:    Height as of this encounter: 5' 11\" (1.803 m). Weight as of this encounter: 160 lb (72.6 kg). ,No LMP for male patient. Physical Exam  Constitutional:       General: He is not in acute distress. Appearance: He is normal weight. He is not ill-appearing. HENT:      Head: Normocephalic and atraumatic. Nose: No congestion or rhinorrhea. Mouth/Throat:      Mouth: Mucous membranes are moist. Oral lesions present. Pharynx: Oropharynx is clear. Posterior oropharyngeal erythema present. No oropharyngeal exudate. Comments: Multiple, small ulcerations noted to the posterior oropharynx, left side of the tonsillar pillars  Cardiovascular:      Rate and Rhythm: Normal rate. Pulmonary:      Effort: Pulmonary effort is normal.   Skin:     General: Skin is warm and dry. Neurological:      General: No focal deficit present. Mental Status: He is alert. Psychiatric:         Mood and Affect: Mood normal.         Behavior: Behavior normal.       DIAGNOSTIC RESULTS     Labs:  Results for orders placed or performed during the hospital encounter of 08/15/22   Strep A culture, throat    Specimen: Throat   Result Value Ref Range    REFLEX THROAT C + S INDICATED    STREP A ANTIGEN   Result Value Ref Range    GROUP A STREP CULTURE, REFLEX Negative        IMAGING:    No orders to display         EKG:      URGENT CARE COURSE:     Vitals:    08/15/22 1748   BP: 128/83   Pulse: (!) 105   Resp: 16   Temp: 98.1 °F (36.7 °C)   TempSrc: Temporal   SpO2: 98%   Weight: 160 lb (72.6 kg)   Height: 5' 11\" (1.803 m)       Medications - No data to display         PROCEDURES:  None    FINAL IMPRESSION      1.  Stomatitis DISPOSITION/ PLAN     Presents for was likely stomatitis. Patient has lesions/ulcerations to the posterior oropharynx, no lesions to the hands or feet. We will treat with dexamethasone elixir. Advised to use salt water gargles, Chloraseptic, Tylenol as needed for pain. Rapid strep was performed at the urgent care and negative. Throat culture in process. Advised to return if no improvement of symptoms 5 days, sooner if worse. PATIENT REFERRED TO:  No primary care provider on file. No primary physician on file. DISCHARGE MEDICATIONS:  Discharge Medication List as of 8/15/2022  6:15 PM        START taking these medications    Details   dexamethasone 0.5 MG/5ML elixir Take 5 mLs by mouth in the morning and 5 mLs at noon and 5 mLs before bedtime. Do all this for 5 days.  Swish and spit., Disp-75 mL, R-0Normal             Discharge Medication List as of 8/15/2022  6:15 PM          Discharge Medication List as of 8/15/2022  6:15 PM          [place]arienville Osler, APRN - CNP    (Please note that portions of this note were completed with a voice recognition program. Efforts were made to edit the dictations but occasionally words are mis-transcribed.)           Aquilla Osler, APRN - CNP  08/15/22 1880

## 2022-08-15 NOTE — DISCHARGE INSTRUCTIONS
Stop vaping    Dexamethasone elixir as directed.   Swish and spit 3 times daily    Continue salt water gargles, Chloraseptic spray, cold slushy drinks    Return for increased redness, swelling or any new concerns

## 2022-08-17 LAB — THROAT/NOSE CULTURE: NORMAL

## 2022-08-22 ENCOUNTER — NURSE ONLY (OUTPATIENT)
Dept: LAB | Age: 22
End: 2022-08-22

## 2022-08-22 ENCOUNTER — OFFICE VISIT (OUTPATIENT)
Dept: FAMILY MEDICINE CLINIC | Age: 22
End: 2022-08-22
Payer: COMMERCIAL

## 2022-08-22 VITALS
SYSTOLIC BLOOD PRESSURE: 120 MMHG | RESPIRATION RATE: 14 BRPM | DIASTOLIC BLOOD PRESSURE: 68 MMHG | OXYGEN SATURATION: 98 % | BODY MASS INDEX: 24.02 KG/M2 | HEIGHT: 71 IN | TEMPERATURE: 99.2 F | WEIGHT: 171.6 LBS | HEART RATE: 88 BPM

## 2022-08-22 DIAGNOSIS — J02.9 PHARYNGITIS, UNSPECIFIED ETIOLOGY: Primary | ICD-10-CM

## 2022-08-22 DIAGNOSIS — F41.9 ANXIETY: ICD-10-CM

## 2022-08-22 DIAGNOSIS — Z72.51 HIGH RISK HETEROSEXUAL BEHAVIOR: ICD-10-CM

## 2022-08-22 LAB — HIV AG/AB: NONREACTIVE

## 2022-08-22 PROCEDURE — 99204 OFFICE O/P NEW MOD 45 MIN: CPT | Performed by: FAMILY MEDICINE

## 2022-08-22 RX ORDER — ESCITALOPRAM OXALATE 10 MG/1
10 TABLET ORAL DAILY
Qty: 30 TABLET | Refills: 3 | Status: SHIPPED | OUTPATIENT
Start: 2022-08-22

## 2022-08-22 NOTE — PROGRESS NOTES
SUBJECTIVE:  Cloyde Schlatter is a 25 y.o. y/o male that presents with Pharyngitis (Pt states that he has had a sore throat for over 2 weeks, he states that he tested for covid and strept and both was negative. Pt states that he was also swabbed for chlamydia and gonorrhea and both came back negative. ) and Anxiety (Pt states that he has ADHD and took himself off the medication and states that he has had issues ever since he took himself off. Pt states that his friends told him that he needs to get back on something for anxiety. )  . HPI:      Symptoms have been present for 2 week(s). Has been seen in the  multiple times for this. Was on dexamethasone mouth wash for this. Has reported that they are improved overall. Fever? Yes - initially, tmax 100.7  Odynophagia? No  Dysphagia? Yes - but has resolved  Cervical adenopathy? Yes    Associated symptoms:  'a little bit of chills'    Pt denies SOB, wheezing, stridor. Has a history of ADHD. Was on focalin for a prolonged time, but quit this 3+ years ago. HAs been noting more anxiety recently. Tends to stress over smaller things and over thinks things. Has some difficulty concentrating. Sleep is 'off and on'. OBJECTIVE:  /68   Pulse 88   Temp 99.2 °F (37.3 °C) (Oral)   Resp 14   Ht 5' 11\" (1.803 m)   Wt 171 lb 9.6 oz (77.8 kg)   SpO2 98%   BMI 23.93 kg/m²   Physical Examination:   General appearance - alert, well appearing, and in no distress  Nose - normal and patent, no erythema, discharge or polyps  Mouth - thrush noted  Neck - supple, no significant adenopathy  Chest - clear to auscultation, no wheezes, rales or rhonchi, symmetric air entry  Heart - normal rate, regular rhythm, normal S1, S2, no murmurs, rubs, clicks or gallops  Skin exam - normal coloration and turgor, no rashes, no suspicious skin lesions noted. ASSESSMENT & PLAN  Eder Etienne was seen today for pharyngitis and anxiety.     Diagnoses and all orders for this visit:    Pharyngitis, unspecified etiology  -     nystatin (MYCOSTATIN) 360937 UNIT/ML suspension; Take 5 mLs by mouth 4 times daily    High risk heterosexual behavior  -     HIV Screen; Future  -     RPR with FTA Relex; Future    Anxiety  -     escitalopram (LEXAPRO) 10 MG tablet; Take 1 tablet by mouth daily      Return in about 1 month (around 9/22/2022). -Sx consistent with OSVALDO, start lexapro  -Discussed side effects and benefits of lexapro.   I advised him that if he develops any SI/HI or concerning symptoms after starting the medication he needs to stop the medication and seek treatment immediately  -Patient advised to call immediately or go to ER if any worsening of symptoms  -Patient counseled on conservative care including fluids, rest and OTC meds

## 2022-08-23 LAB — RPR: NONREACTIVE

## 2022-08-29 ENCOUNTER — OFFICE VISIT (OUTPATIENT)
Dept: FAMILY MEDICINE CLINIC | Age: 22
End: 2022-08-29
Payer: COMMERCIAL

## 2022-08-29 VITALS
TEMPERATURE: 96.9 F | OXYGEN SATURATION: 97 % | RESPIRATION RATE: 16 BRPM | DIASTOLIC BLOOD PRESSURE: 76 MMHG | HEIGHT: 71 IN | BODY MASS INDEX: 23.66 KG/M2 | HEART RATE: 72 BPM | SYSTOLIC BLOOD PRESSURE: 122 MMHG | WEIGHT: 169 LBS

## 2022-08-29 DIAGNOSIS — B37.0 THRUSH, ORAL: Primary | ICD-10-CM

## 2022-08-29 PROCEDURE — 99213 OFFICE O/P EST LOW 20 MIN: CPT | Performed by: FAMILY MEDICINE

## 2022-08-29 RX ORDER — CLOTRIMAZOLE 10 MG/1
10 LOZENGE ORAL; TOPICAL
Qty: 50 TABLET | Refills: 0 | Status: SHIPPED | OUTPATIENT
Start: 2022-08-29 | End: 2022-09-08

## 2022-08-29 RX ORDER — FLUCONAZOLE 100 MG/1
100 TABLET ORAL DAILY
Qty: 5 TABLET | Refills: 0 | Status: SHIPPED | OUTPATIENT
Start: 2022-08-29 | End: 2022-09-03

## 2022-08-29 ASSESSMENT — PATIENT HEALTH QUESTIONNAIRE - PHQ9
SUM OF ALL RESPONSES TO PHQ QUESTIONS 1-9: 0
2. FEELING DOWN, DEPRESSED OR HOPELESS: 0
SUM OF ALL RESPONSES TO PHQ QUESTIONS 1-9: 0
1. LITTLE INTEREST OR PLEASURE IN DOING THINGS: 0
SUM OF ALL RESPONSES TO PHQ9 QUESTIONS 1 & 2: 0

## 2022-08-29 NOTE — PROGRESS NOTES
SUBJECTIVE:  Samantha Stanton is a 25 y.o. y/o male that presents with Pharyngitis and Other (Tongue hurts)  . HPI:  Reports that symptoms improved with thrush teratment but still present. Symptoms have been present for 3 week(s). Fever? No  Odynophagia? Yes - 'a little'  Dysphagia? No  Cough? No  Rhinitis? No  Hx of EBV? No  Sick Contacts? No    Pt denies SOB, wheezing, stridor, nausea or vomiting. OBJECTIVE:  /76   Pulse 72   Temp 96.9 °F (36.1 °C) (Infrared)   Resp 16   Ht 5' 11\" (1.803 m)   Wt 169 lb (76.7 kg)   SpO2 97%   BMI 23.57 kg/m²   Physical Examination:   General appearance - alert, well appearing, and in no distress  Ears - bilateral TM's and external ear canals normal  Nose - normal and patent, no erythema, discharge or polyps  Mouth - thrush noted and minimal improvement from last time  Neck - supple, no significant adenopathy  Chest - clear to auscultation, no wheezes, rales or rhonchi, symmetric air entry  Heart - normal rate, regular rhythm, normal S1, S2, no murmurs, rubs, clicks or gallops  Abdomen - soft, nontender, nondistended, no masses or organomegaly  Musculoskeletal - no joint tenderness, deformity or swelling  Skin exam - normal coloration and turgor, no rashes, no suspicious skin lesions noted. ASSESSMENT & PLAN  Aysha Ngo was seen today for pharyngitis and other. Diagnoses and all orders for this visit:    Thrush, oral  -     clotrimazole (MYCELEX) 10 MG wyatt; Take 1 tablet by mouth 5 times daily for 10 days  -     fluconazole (DIFLUCAN) 100 MG tablet;  Take 1 tablet by mouth daily for 5 days      Return if symptoms worsen or fail to improve.    -Trial above meds, if no improvement, will refer to ENT  -Patient advised to call immediately or go to ER if any worsening of symptoms  -Patient counseled on conservative care including fluids, rest and OTC meds

## 2022-09-02 ENCOUNTER — PATIENT MESSAGE (OUTPATIENT)
Dept: FAMILY MEDICINE CLINIC | Age: 22
End: 2022-09-02

## 2022-09-02 DIAGNOSIS — J02.9 PHARYNGITIS, UNSPECIFIED ETIOLOGY: Primary | ICD-10-CM

## 2022-09-13 DIAGNOSIS — J02.9 PHARYNGITIS, UNSPECIFIED ETIOLOGY: ICD-10-CM

## 2022-09-19 ENCOUNTER — TELEPHONE (OUTPATIENT)
Dept: ENT CLINIC | Age: 22
End: 2022-09-19

## 2022-09-19 NOTE — TELEPHONE ENCOUNTER
Patient called saying he cannot wait until November to be seen. He's a new patient coming in for Pharyngitis, unspecified etiology. Would you or Crystal be able to see this dx or is this an appointment that needs to stick with a doctor? Please advise.

## 2022-09-20 ENCOUNTER — NURSE ONLY (OUTPATIENT)
Dept: LAB | Age: 22
End: 2022-09-20

## 2022-09-20 ENCOUNTER — OFFICE VISIT (OUTPATIENT)
Dept: FAMILY MEDICINE CLINIC | Age: 22
End: 2022-09-20
Payer: COMMERCIAL

## 2022-09-20 VITALS
TEMPERATURE: 99.7 F | DIASTOLIC BLOOD PRESSURE: 70 MMHG | HEIGHT: 71 IN | HEART RATE: 84 BPM | RESPIRATION RATE: 16 BRPM | OXYGEN SATURATION: 99 % | SYSTOLIC BLOOD PRESSURE: 118 MMHG | BODY MASS INDEX: 24.15 KG/M2 | WEIGHT: 172.5 LBS

## 2022-09-20 DIAGNOSIS — R53.83 OTHER FATIGUE: ICD-10-CM

## 2022-09-20 DIAGNOSIS — J02.9 SORE THROAT: Primary | ICD-10-CM

## 2022-09-20 DIAGNOSIS — H93.8X9 PRESSURE SENSATION IN EAR, UNSPECIFIED LATERALITY: ICD-10-CM

## 2022-09-20 DIAGNOSIS — R50.9 FEVER, UNSPECIFIED FEVER CAUSE: ICD-10-CM

## 2022-09-20 DIAGNOSIS — R09.81 SINUS CONGESTION: ICD-10-CM

## 2022-09-20 DIAGNOSIS — J02.9 SORE THROAT: ICD-10-CM

## 2022-09-20 DIAGNOSIS — R52 BODY ACHES: ICD-10-CM

## 2022-09-20 LAB
ERYTHROCYTE [DISTWIDTH] IN BLOOD BY AUTOMATED COUNT: 12.8 % (ref 11.5–14.5)
ERYTHROCYTE [DISTWIDTH] IN BLOOD BY AUTOMATED COUNT: 41.1 FL (ref 35–45)
HCT VFR BLD CALC: 43.7 % (ref 42–52)
HEMOGLOBIN: 15.4 GM/DL (ref 14–18)
Lab: 0
MCH RBC QN AUTO: 31 PG (ref 26–33)
MCHC RBC AUTO-ENTMCNC: 35.2 GM/DL (ref 32.2–35.5)
MCV RBC AUTO: 87.9 FL (ref 80–94)
PLATELET # BLD: 207 THOU/MM3 (ref 130–400)
PMV BLD AUTO: 10.1 FL (ref 9.4–12.4)
QC PASS/FAIL: 0
RBC # BLD: 4.97 MILL/MM3 (ref 4.7–6.1)
S PYO AG THROAT QL: NORMAL
SARS-COV-2 RDRP RESP QL NAA+PROBE: NEGATIVE
WBC # BLD: 6.7 THOU/MM3 (ref 4.8–10.8)

## 2022-09-20 PROCEDURE — 87880 STREP A ASSAY W/OPTIC: CPT | Performed by: NURSE PRACTITIONER

## 2022-09-20 PROCEDURE — 87635 SARS-COV-2 COVID-19 AMP PRB: CPT | Performed by: NURSE PRACTITIONER

## 2022-09-20 PROCEDURE — 99214 OFFICE O/P EST MOD 30 MIN: CPT | Performed by: NURSE PRACTITIONER

## 2022-09-20 RX ORDER — PREDNISONE 20 MG/1
40 TABLET ORAL DAILY
Qty: 14 TABLET | Refills: 0 | Status: SHIPPED | OUTPATIENT
Start: 2022-09-20 | End: 2022-09-27

## 2022-09-20 RX ORDER — FLUTICASONE PROPIONATE 50 MCG
2 SPRAY, SUSPENSION (ML) NASAL DAILY
Qty: 48 G | Refills: 1 | Status: SHIPPED | OUTPATIENT
Start: 2022-09-20

## 2022-09-20 SDOH — ECONOMIC STABILITY: FOOD INSECURITY: WITHIN THE PAST 12 MONTHS, YOU WORRIED THAT YOUR FOOD WOULD RUN OUT BEFORE YOU GOT MONEY TO BUY MORE.: NEVER TRUE

## 2022-09-20 SDOH — ECONOMIC STABILITY: FOOD INSECURITY: WITHIN THE PAST 12 MONTHS, THE FOOD YOU BOUGHT JUST DIDN'T LAST AND YOU DIDN'T HAVE MONEY TO GET MORE.: NEVER TRUE

## 2022-09-20 ASSESSMENT — SOCIAL DETERMINANTS OF HEALTH (SDOH): HOW HARD IS IT FOR YOU TO PAY FOR THE VERY BASICS LIKE FOOD, HOUSING, MEDICAL CARE, AND HEATING?: NOT HARD AT ALL

## 2022-09-20 NOTE — TELEPHONE ENCOUNTER
I tried to call patient to get him scheduled sooner, but there was no answer and voicemail is full.    I sent [employer] message for patient to call us

## 2022-09-21 ENCOUNTER — TELEPHONE (OUTPATIENT)
Dept: FAMILY MEDICINE CLINIC | Age: 22
End: 2022-09-21

## 2022-09-21 NOTE — TELEPHONE ENCOUNTER
----- Message from ANALY Hurd - CNP sent at 9/20/2022  4:43 PM EDT -----  Labs completely normal  Start flonase and prednisone. Let us know if worsening in condition. I will check in on him on Thursday. Advise that he call ENT back and get sooner appt if possible.

## 2022-09-22 ENCOUNTER — PATIENT MESSAGE (OUTPATIENT)
Dept: FAMILY MEDICINE CLINIC | Age: 22
End: 2022-09-22

## 2022-09-22 NOTE — TELEPHONE ENCOUNTER
From: Betsey Moon  To: Sheree Line  Sent: 9/22/2022 8:27 AM EDT  Subject: Health     It has been near 48 hours and I do feel a lot better. The head ache and body aches and cold & hot fevers have gone; however, my throat is still dry and my nose is still under some pressure and my tongue is also dry. I figured this it better then Tuesday so I will have to see if the medicine continues to help. I also cant get ahold of the ENT. I tried calling multiple time yesterday and each time the were busy or they didnt . I would like some help to get ahold of them.

## 2022-09-22 NOTE — TELEPHONE ENCOUNTER
[facility]! Im glad you are feeling somewhat improved. Push fluids, and can take sudafed if still feeling congested. Can we reach out to him to get a sooner appt with ENT. Thank you!  (See previous note from pt)

## 2022-10-06 RX ORDER — VALACYCLOVIR HYDROCHLORIDE 1 G/1
1000 TABLET, FILM COATED ORAL 2 TIMES DAILY
Qty: 7 TABLET | Refills: 0 | Status: SHIPPED | OUTPATIENT
Start: 2022-10-06 | End: 2022-10-17

## 2022-10-17 ENCOUNTER — OFFICE VISIT (OUTPATIENT)
Dept: FAMILY MEDICINE CLINIC | Age: 22
End: 2022-10-17
Payer: COMMERCIAL

## 2022-10-17 VITALS
TEMPERATURE: 97 F | SYSTOLIC BLOOD PRESSURE: 110 MMHG | BODY MASS INDEX: 25 KG/M2 | HEIGHT: 71 IN | OXYGEN SATURATION: 97 % | WEIGHT: 178.6 LBS | DIASTOLIC BLOOD PRESSURE: 70 MMHG | HEART RATE: 100 BPM | RESPIRATION RATE: 16 BRPM

## 2022-10-17 DIAGNOSIS — J02.9 SORE THROAT: Primary | ICD-10-CM

## 2022-10-17 DIAGNOSIS — J02.9 PHARYNGITIS, UNSPECIFIED ETIOLOGY: ICD-10-CM

## 2022-10-17 DIAGNOSIS — H65.02 NON-RECURRENT ACUTE SEROUS OTITIS MEDIA OF LEFT EAR: ICD-10-CM

## 2022-10-17 LAB
INFLUENZA A ANTIBODY: NEGATIVE
INFLUENZA B ANTIBODY: NEGATIVE
Lab: NORMAL
QC PASS/FAIL: NORMAL
S PYO AG THROAT QL: NORMAL
SARS-COV-2 RDRP RESP QL NAA+PROBE: NEGATIVE

## 2022-10-17 PROCEDURE — 99213 OFFICE O/P EST LOW 20 MIN: CPT | Performed by: NURSE PRACTITIONER

## 2022-10-17 PROCEDURE — 87880 STREP A ASSAY W/OPTIC: CPT | Performed by: NURSE PRACTITIONER

## 2022-10-17 PROCEDURE — 87635 SARS-COV-2 COVID-19 AMP PRB: CPT | Performed by: NURSE PRACTITIONER

## 2022-10-17 PROCEDURE — 87804 INFLUENZA ASSAY W/OPTIC: CPT | Performed by: NURSE PRACTITIONER

## 2022-10-17 RX ORDER — DOXYCYCLINE 100 MG/1
100 TABLET ORAL 2 TIMES DAILY
COMMUNITY
Start: 2022-09-28 | End: 2022-10-17

## 2022-10-17 RX ORDER — FEXOFENADINE HYDROCHLORIDE 180 MG/1
1 TABLET, FILM COATED ORAL DAILY
COMMUNITY
Start: 2022-09-28

## 2022-10-17 RX ORDER — AMOXICILLIN AND CLAVULANATE POTASSIUM 875; 125 MG/1; MG/1
1 TABLET, FILM COATED ORAL 2 TIMES DAILY
Qty: 14 TABLET | Refills: 0 | Status: SHIPPED | OUTPATIENT
Start: 2022-10-17 | End: 2022-10-24

## 2022-10-17 ASSESSMENT — ENCOUNTER SYMPTOMS
SORE THROAT: 1
SHORTNESS OF BREATH: 0
RHINORRHEA: 1

## 2022-10-17 NOTE — PROGRESS NOTES
Lizeth Iyer is a 25 y.o. male thatpresents for Generalized Body Aches, Headache, and Pharyngitis      History obtained today from Patient. URI    HPI:      Symptoms have been present for 2 day(s). Symptoms are unchanged since they initially started. Fever? Yes and chills 100.3  Runny nose or congestion? Yes  Cough? No  Sore throat? Yes  Shortness of breath/Wheezing? No  Other associated symptoms?  ear pain and headaches      Known COVID exposures or RFs? No  Smoker? No  Preexisting Respiratory conditions?  none    Taking Nyquil over the counter for the symptoms. Drinking fluids ok . Chronic sore throat  Seen by ENT 9/28/2022 and started on allergy medication, Doxycycline and Bactroban. Had allergy testing and CT scan scheduled. I have reviewed the patient's past medical history, past surgical history, allergies,medications, social and family history and I have made updates where appropriate. History reviewed. No pertinent past medical history. Social History     Tobacco Use    Smoking status: Never    Smokeless tobacco: Never   Vaping Use    Vaping Use: Some days    Substances: Nicotine, Flavoring   Substance Use Topics    Alcohol use: Yes     Comment: social    Drug use: Never       History reviewed. No pertinent family history. Review of Systems   Constitutional:  Positive for fatigue and fever. HENT:  Positive for congestion, ear pain, rhinorrhea and sore throat. Respiratory:  Negative for shortness of breath. PHYSICAL EXAM:  /70   Pulse 100   Temp 97 °F (36.1 °C) (Infrared)   Resp 16   Ht 5' 11\" (1.803 m)   Wt 178 lb 9.6 oz (81 kg)   SpO2 97%   BMI 24.91 kg/m²     Physical Exam  HENT:      Left Ear: Swelling present. Tympanic membrane is erythematous. Mouth/Throat:      Mouth: Mucous membranes are moist.      Pharynx: Posterior oropharyngeal erythema present. Cardiovascular:      Rate and Rhythm: Regular rhythm.    Abdominal:      Palpations: Abdomen is soft. Skin:     General: Skin is warm and dry. Neurological:      Mental Status: He is oriented to person, place, and time. Psychiatric:         Mood and Affect: Mood normal.         Behavior: Behavior normal.         Thought Content: Thought content normal.         Judgment: Judgment normal.         ASSESSMENT & PLAN  Josselyn Lopez was seen today for generalized body aches, headache and pharyngitis. Diagnoses and all orders for this visit:    Sore throat  -     POCT COVID-19 Rapid, NAAT  -     POCT Influenza A/B  -     POCT rapid strep A    Non-recurrent acute serous otitis media of left ear  -     amoxicillin-clavulanate (AUGMENTIN) 875-125 MG per tablet; Take 1 tablet by mouth 2 times daily for 7 days    Pharyngitis, unspecified etiology  -     amoxicillin-clavulanate (AUGMENTIN) 875-125 MG per tablet; Take 1 tablet by mouth 2 times daily for 7 days        No red flag sx and Follow up with our practice if no improvement or worsening sx. COVID -  Influenza -  Strep -    All copied or forwarded information in the progress note was verified by me to be accurate at the time of visit  Patient's past medical, surgical, social and family history were reviewed and updated     All patient questions answered. Patient voiced understanding.

## 2022-12-15 ENCOUNTER — OFFICE VISIT (OUTPATIENT)
Dept: FAMILY MEDICINE CLINIC | Age: 22
End: 2022-12-15
Payer: COMMERCIAL

## 2022-12-15 VITALS
TEMPERATURE: 98.2 F | BODY MASS INDEX: 25.37 KG/M2 | OXYGEN SATURATION: 98 % | HEART RATE: 72 BPM | DIASTOLIC BLOOD PRESSURE: 70 MMHG | SYSTOLIC BLOOD PRESSURE: 124 MMHG | HEIGHT: 71 IN | WEIGHT: 181.2 LBS | RESPIRATION RATE: 14 BRPM

## 2022-12-15 DIAGNOSIS — R10.2 PERINEAL PAIN: ICD-10-CM

## 2022-12-15 DIAGNOSIS — N48.1 BALANITIS: Primary | ICD-10-CM

## 2022-12-15 PROCEDURE — 99214 OFFICE O/P EST MOD 30 MIN: CPT | Performed by: NURSE PRACTITIONER

## 2022-12-15 RX ORDER — DIAPER,BRIEF,INFANT-TODD,DISP
EACH MISCELLANEOUS
Qty: 30 G | Refills: 2 | Status: SHIPPED | OUTPATIENT
Start: 2022-12-15 | End: 2022-12-22

## 2022-12-15 RX ORDER — CLOTRIMAZOLE 1 %
CREAM (GRAM) TOPICAL
Qty: 40 G | Refills: 1 | Status: SHIPPED | OUTPATIENT
Start: 2022-12-15 | End: 2022-12-22

## 2022-12-15 RX ORDER — DOXYCYCLINE HYCLATE 100 MG
100 TABLET ORAL 2 TIMES DAILY
Qty: 14 TABLET | Refills: 0 | Status: SHIPPED | OUTPATIENT
Start: 2022-12-15 | End: 2022-12-22

## 2022-12-15 RX ORDER — FLUCONAZOLE 100 MG/1
100 TABLET ORAL 2 TIMES DAILY
Qty: 14 TABLET | Refills: 0 | Status: SHIPPED | OUTPATIENT
Start: 2022-12-15 | End: 2022-12-22

## 2022-12-15 RX ORDER — PREDNISONE 20 MG/1
40 TABLET ORAL DAILY
Qty: 10 TABLET | Refills: 0 | Status: SHIPPED | OUTPATIENT
Start: 2022-12-15 | End: 2022-12-20

## 2022-12-15 NOTE — PROGRESS NOTES
Suzanne Osuna is a 25 y.o. male thatpresents for Other (Pt states he has some redness, pain, itching and smell for a couple months in his genital area )      History obtained today from Patient. C/o soreness at tip of penis x 4 mos  No drainage out of penis   No open sores  No itching   No pain with urination  No gross hematuria  Sexually active, uses protection most times, didn't have any new partners when this started   Was tested for G&C, was negative, was positive last year and treated  Notes some perineal discomfort intermittently  Bowels are moving okay  Does FARIBA, not felt any masses or anything out of the ordinary    I have reviewed the patient's past medical history, past surgical history, allergies,medications, social and family history and I have made updates where appropriate. History reviewed. No pertinent past medical history. Social History     Tobacco Use    Smoking status: Never    Smokeless tobacco: Never   Vaping Use    Vaping Use: Some days    Substances: Nicotine, Flavoring   Substance Use Topics    Alcohol use: Yes     Comment: social    Drug use: Never       History reviewed. No pertinent family history. Review of Systems        PHYSICAL EXAM:  /70   Pulse 72   Temp 98.2 °F (36.8 °C) (Oral)   Resp 14   Ht 5' 11\" (1.803 m)   Wt 181 lb 3.2 oz (82.2 kg)   SpO2 98%   BMI 25.27 kg/m²     Physical Exam  Constitutional:       Appearance: Normal appearance. HENT:      Head: Normocephalic and atraumatic. Right Ear: External ear normal.      Left Ear: External ear normal.      Nose: Nose normal.      Mouth/Throat:      Mouth: Mucous membranes are moist.   Eyes:      Conjunctiva/sclera: Conjunctivae normal.   Cardiovascular:      Rate and Rhythm: Normal rate and regular rhythm. Pulses: Normal pulses. Heart sounds: Normal heart sounds. Pulmonary:      Effort: Pulmonary effort is normal.      Breath sounds: Normal breath sounds.    Abdominal:      General: Bowel sounds are normal.      Palpations: Abdomen is soft. Genitourinary:     Comments: Tip of penis is erythematous and irritated  No drainage  Musculoskeletal:         General: Normal range of motion. Cervical back: Normal range of motion. Skin:     General: Skin is warm and dry. Neurological:      General: No focal deficit present. Mental Status: He is alert and oriented to person, place, and time. Psychiatric:         Mood and Affect: Mood normal.         Behavior: Behavior normal.         ASSESSMENT & PLAN  Harper Dey was seen today for other. Diagnoses and all orders for this visit:    Balanitis  -     clotrimazole (LOTRIMIN AF) 1 % cream; Apply topically 2 times daily. -     hydrocortisone (ALA-HELLEN) 1 % cream; Apply topically 2 times daily. -     fluconazole (DIFLUCAN) 100 MG tablet; Take 1 tablet by mouth in the morning and at bedtime for 7 days    Perineal pain  -     doxycycline hyclate (VIBRA-TABS) 100 MG tablet; Take 1 tablet by mouth 2 times daily for 7 days  -     predniSONE (DELTASONE) 20 MG tablet; Take 2 tablets by mouth daily for 5 days    Treating for possible prostatitis, will recheck symptoms next week  If not improving, will continue Doxy another 7 days    No follow-ups on file. Start above treatments    All copied or forwarded information in the progress note was verified by me to be accurate at the time of visit  Patient's past medical, surgical, social and family history were reviewed and updated     All patient questions answered. Patient voiced understanding.

## 2022-12-21 ENCOUNTER — PATIENT MESSAGE (OUTPATIENT)
Dept: FAMILY MEDICINE CLINIC | Age: 22
End: 2022-12-21

## 2022-12-21 DIAGNOSIS — N48.1 BALANITIS: Primary | ICD-10-CM

## 2022-12-21 DIAGNOSIS — R10.2 PERINEAL PAIN: ICD-10-CM

## 2022-12-22 NOTE — TELEPHONE ENCOUNTER
From: Loraine Pérez  Sent: 12/21/2022 6:15 PM EST  To: [facility]  [address] Clinical Staff  Subject: Follow up    To add on Feliciano had a slight sore throat and I was told that was just do to my surgery but Im not to sure.

## 2023-01-24 ENCOUNTER — OFFICE VISIT (OUTPATIENT)
Dept: UROLOGY | Age: 23
End: 2023-01-24
Payer: COMMERCIAL

## 2023-01-24 VITALS — BODY MASS INDEX: 23.8 KG/M2 | RESPIRATION RATE: 17 BRPM | WEIGHT: 170 LBS | HEIGHT: 71 IN

## 2023-01-24 DIAGNOSIS — N48.89 PENILE PAIN: Primary | ICD-10-CM

## 2023-01-24 PROCEDURE — 99204 OFFICE O/P NEW MOD 45 MIN: CPT | Performed by: UROLOGY

## 2023-01-24 RX ORDER — DOXYCYCLINE HYCLATE 100 MG/1
100 CAPSULE ORAL 2 TIMES DAILY
Qty: 28 CAPSULE | Refills: 0 | Status: SHIPPED | OUTPATIENT
Start: 2023-01-24 | End: 2023-02-07

## 2023-01-24 RX ORDER — NYSTATIN AND TRIAMCINOLONE ACETONIDE 100000; 1 [USP'U]/G; MG/G
OINTMENT TOPICAL
Qty: 1 EACH | Refills: 3 | Status: SHIPPED | OUTPATIENT
Start: 2023-01-24

## 2023-01-24 NOTE — PROGRESS NOTES
Carina Murrieta MD.    [address] [address]  Dept: [phone]  Dept Fax: [phone]  Loc: [address] [facility] Urology Office Note -     Patient:  Nima Woodson  YOB: 2000    The patient is a 25 y.o. male who presents today for evaluation of the following problems:   Chief Complaint   Patient presents with    Pain     Balantis perineal pain   Pt states pain started a couple of months ago. Describes as a sometimes sharp pain and when not sharp its a noticeable irritation. referred/consultation requested by No primary care provider on file. Shelbie Harding History of Present Illness:    Penile pain  For months  Erythema and pain at tip of penis  Sharp pains in penis  Hx of chlamydia > 1 year ago  No hematuria  No testicular pain  Does do heavy lifting for work  +vape    balanitis  uncircumcised          Requested/reviewed records from No primary care provider on file. office and/or outside physician/EMR    (Patient's old records have been requested, reviewed and pertinent findings summarized in today's note.)    Procedures Today: N/A      Last several PSA's:  No results found for: PSA    Last total testosterone:  No results found for: TESTOSTERONE    Urinalysis today:  No results found for this visit on 01/24/23. Last BUN and creatinine:  No results found for: BUN  No results found for: CREATININE      Imaging Reviewed during this Office Visit:   Tamela Pink MD independently reviewed the images and verified the radiology reports from:    No results found. PAST MEDICAL, FAMILY AND SOCIAL HISTORY:  No past medical history on file. Past Surgical History:   Procedure Laterality Date    EYE SURGERY       No family history on file.   Outpatient Medications Marked as Taking for the 1/24/23 encounter (Office Visit) with Germaine Julien MD   Medication Sig Dispense Refill    mupirocin (BACTROBAN) 2 % ointment Apply 1 each topically in the morning and at bedtime      fluticasone (FLONASE) 50 MCG/ACT nasal spray 2 sprays by Each Nostril route daily 48 g 1       Patient has no known allergies. Social History     Tobacco Use   Smoking Status Never   Smokeless Tobacco Never      (If patient a smoker, smoking cessation counseling offered)   Social History     Substance and Sexual Activity   Alcohol Use Yes    Comment: social       REVIEW OF SYSTEMS:  Constitutional: negative  Eyes: negative  Respiratory: negative  Cardiovascular: negative  Gastrointestinal: negative  Genitourinary: see HPI  Musculoskeletal: negative  Skin: negative   Neurological: negative  Hematological/Lymphatic: negative  Psychological: negative      Physical Exam:    This a 25 y.o. male  Vitals:    01/24/23 1440   Resp: 17     Body mass index is 23.71 kg/m². Constitutional: Patient in no acute distress; Neuro: alert and oriented to person place and time. Urethritis++  Some balanitis+    Assessment and Plan        1. Penile pain               Plan:      Penile pain radiating to perineum and testicles. No split stream  Some balanitis- mycolog  Doxy for 2 weeks  Motrin tid PRN    If no improvement, cystoscopy      Prescriptions Ordered:  No orders of the defined types were placed in this encounter. Orders Placed:  No orders of the defined types were placed in this encounter.            Amandeep Anguiano MD

## 2023-02-22 ENCOUNTER — OFFICE VISIT (OUTPATIENT)
Dept: UROLOGY | Age: 23
End: 2023-02-22
Payer: COMMERCIAL

## 2023-02-22 VITALS — RESPIRATION RATE: 18 BRPM | WEIGHT: 170 LBS | BODY MASS INDEX: 23.8 KG/M2 | HEIGHT: 71 IN

## 2023-02-22 DIAGNOSIS — N48.1 BALANITIS: ICD-10-CM

## 2023-02-22 DIAGNOSIS — N48.89 PENILE PAIN: Primary | ICD-10-CM

## 2023-02-22 PROCEDURE — 99214 OFFICE O/P EST MOD 30 MIN: CPT

## 2023-02-22 NOTE — PROGRESS NOTES
[address] [facility] [id number]  Dept: [phone]  Loc: [phone]  Visit Date: 2/22/2023    [facility]  Patti Denis is a 26 y.o. male that presents to the urology clinic for penile pain follow-up. Patient was first seen as a new patient by Dr. Blaise Schwartz on 1/24 for complaints of penile pain lasting several months. Noted to have erythema and pain at the urethral meatus. Has a history of chlamydia ~ 1 year ago. No hematuria. No testicular pain. Treated for Balanitis with Mycolog and covered for suspected urethritis with Doxycycline. Today, patient states his pain is \"much better. \" No pain with urination. White discharge still present inside foreskin. Erythema improving. Patient admits he is interested in circumcision. Bothered both cosmetically and with the hassle of retracting and cleaning. Pain Scale 1/10        PAST MEDICAL, FAMILY AND SOCIAL HISTORY UPDATE:  No past medical history on file. Past Surgical History:   Procedure Laterality Date    EYE SURGERY       No family history on file. No outpatient medications have been marked as taking for the 2/22/23 encounter (Appointment) with Trudi Garcia PA-C. Patient has no known allergies. Social History     Tobacco Use   Smoking Status Never   Smokeless Tobacco Never       Social History     Substance and Sexual Activity   Alcohol Use Yes    Comment: social       REVIEW OF SYSTEMS:  Constitutional: negative  Eyes: negative  Respiratory: negative  Cardiovascular: negative  Gastrointestinal: negative  Musculoskeletal: negative  Genitourinary: negative except for what is in HPI  Skin: negative   Neurological: negative  Hematological/Lymphatic: negative  Psychological: negative    Physical Exam:    There were no vitals filed for this visit. Patient is a 26 y.o. male in no acute distress and alert and oriented to person, place and time.     Pulmonary: Non-labored respiration. Cardiovascular: Normal rate, regular rhythm, normal peripheral pulses. Skin: Warm and dry. Psych: Normal mood and affect. Genitourinary: Uncircumcised male. Foreskin easily retracted. Presence of white discharge within foreskin. Mild erythema. Assessment and Plan   Penile Pain  - Resolved following Doxycyline course. No pain with urination. No longer extending into the testicles and perineum. Balanitis  - Erythema mild but persisting. White discharge present on physical exam. Continue to apply Mycolog cream twice daily. - Counseled to apply cream after showering/washing area. Apply to clean and dry surface. *Follow-up in 3 months for symptom check and discussion regarding interest in elective circumcision.       Justin Gutierrez PA-C  Urology

## 2023-03-09 ENCOUNTER — OFFICE VISIT (OUTPATIENT)
Dept: FAMILY MEDICINE CLINIC | Age: 23
End: 2023-03-09
Payer: COMMERCIAL

## 2023-03-09 VITALS
OXYGEN SATURATION: 99 % | HEIGHT: 71 IN | RESPIRATION RATE: 14 BRPM | TEMPERATURE: 97.1 F | HEART RATE: 67 BPM | DIASTOLIC BLOOD PRESSURE: 72 MMHG | BODY MASS INDEX: 25.23 KG/M2 | SYSTOLIC BLOOD PRESSURE: 118 MMHG | WEIGHT: 180.2 LBS

## 2023-03-09 DIAGNOSIS — H65.02 ACUTE SEROUS OTITIS MEDIA OF LEFT EAR, RECURRENCE NOT SPECIFIED: ICD-10-CM

## 2023-03-09 DIAGNOSIS — H65.193 ACUTE MEE (MIDDLE EAR EFFUSION), BILATERAL: Primary | ICD-10-CM

## 2023-03-09 PROCEDURE — 99213 OFFICE O/P EST LOW 20 MIN: CPT | Performed by: NURSE PRACTITIONER

## 2023-03-09 RX ORDER — PREDNISONE 20 MG/1
40 TABLET ORAL DAILY
Qty: 14 TABLET | Refills: 0 | Status: SHIPPED | OUTPATIENT
Start: 2023-03-09 | End: 2023-03-16

## 2023-03-09 RX ORDER — AMOXICILLIN AND CLAVULANATE POTASSIUM 875; 125 MG/1; MG/1
1 TABLET, FILM COATED ORAL 2 TIMES DAILY
Qty: 20 TABLET | Refills: 0 | Status: SHIPPED | OUTPATIENT
Start: 2023-03-09 | End: 2023-03-19

## 2023-03-09 RX ORDER — AMOXICILLIN 500 MG/1
500 CAPSULE ORAL 2 TIMES DAILY
Qty: 20 CAPSULE | Refills: 0 | Status: CANCELLED | OUTPATIENT
Start: 2023-03-09 | End: 2023-03-19

## 2023-03-09 NOTE — PROGRESS NOTES
SUBJECTIVE:  Katya Galo is a 26 y.o. y/o male that presents with Otalgia and Ear Fullness  . bilateral Ear Complaint    HPI:  Symptoms have been present for 2 day(s). Inciting incident or history of trauma? no  Decreased hearing? Yes  Ear tenderness? No  Ear drainage? No  Feeling of fullness? Yes  Dizziness or pre-syncope? No  Associated symptoms - Ear pain  started in left ear now right ear. There is no problem list on file for this patient. OBJECTIVE:  /72   Pulse 67   Temp 97.1 °F (36.2 °C) (Temporal)   Resp 14   Ht 5' 11\" (1.803 m)   Wt 180 lb 3.2 oz (81.7 kg)   SpO2 99%   BMI 25.13 kg/m²   General appearance: alert, well appearing, and in no distress. HEENT:  normal appearance, bilateral TM fluid noted. Left TM erythematous  Neck:  Supple without masses, no cervical adenopathy  Skin exam - normal coloration and turgor, no rashes, no suspicious skin lesions noted. Psych:  No evidence of anxiety or depression      ASSESSMENT & PLAN  Steven Flores was seen today for otalgia and ear fullness. Diagnoses and all orders for this visit:    Acute WILD (middle ear effusion), bilateral  -     predniSONE (DELTASONE) 20 MG tablet; Take 2 tablets by mouth daily for 7 days  -     amoxicillin-clavulanate (AUGMENTIN) 875-125 MG per tablet;  Take 1 tablet by mouth 2 times daily for 10 days    Acute serous otitis media of left ear, recurrence not specified      No follow-ups on file.    -Start above treatments  -Patient advised to contact our office immediately if symptoms worsen or persist  -Patient counseled on conservative care including OTC meds

## 2023-03-22 ENCOUNTER — HOSPITAL ENCOUNTER (EMERGENCY)
Age: 23
Discharge: HOME | End: 2023-03-22
Payer: COMMERCIAL

## 2023-03-22 VITALS
OXYGEN SATURATION: 97 % | RESPIRATION RATE: 16 BRPM | TEMPERATURE: 97 F | DIASTOLIC BLOOD PRESSURE: 85 MMHG | SYSTOLIC BLOOD PRESSURE: 129 MMHG | HEART RATE: 105 BPM

## 2023-03-22 DIAGNOSIS — H66.91 RIGHT OTITIS MEDIA, UNSPECIFIED OTITIS MEDIA TYPE: ICD-10-CM

## 2023-03-22 DIAGNOSIS — K12.2 UVULITIS: Primary | ICD-10-CM

## 2023-03-22 LAB — S PYO AG THROAT QL: NEGATIVE

## 2023-03-22 PROCEDURE — 99213 OFFICE O/P EST LOW 20 MIN: CPT | Performed by: NURSE PRACTITIONER

## 2023-03-22 PROCEDURE — 87651 STREP A DNA AMP PROBE: CPT

## 2023-03-22 PROCEDURE — 99213 OFFICE O/P EST LOW 20 MIN: CPT

## 2023-03-22 RX ORDER — CEFDINIR 300 MG/1
300 CAPSULE ORAL 2 TIMES DAILY
Qty: 20 CAPSULE | Refills: 0 | Status: SHIPPED | OUTPATIENT
Start: 2023-03-22 | End: 2023-04-01

## 2023-03-22 ASSESSMENT — PAIN SCALES - GENERAL: PAINLEVEL_OUTOF10: 8

## 2023-03-22 ASSESSMENT — ENCOUNTER SYMPTOMS
DIARRHEA: 0
VOMITING: 0
COUGH: 0
NAUSEA: 0
SHORTNESS OF BREATH: 0
SORE THROAT: 1

## 2023-03-22 ASSESSMENT — PAIN DESCRIPTION - PAIN TYPE: TYPE: ACUTE PAIN

## 2023-03-22 ASSESSMENT — PAIN - FUNCTIONAL ASSESSMENT: PAIN_FUNCTIONAL_ASSESSMENT: 0-10

## 2023-03-22 ASSESSMENT — PAIN DESCRIPTION - DESCRIPTORS: DESCRIPTORS: SHARP

## 2023-03-22 ASSESSMENT — PAIN DESCRIPTION - FREQUENCY: FREQUENCY: CONTINUOUS

## 2023-03-22 ASSESSMENT — PAIN DESCRIPTION - LOCATION: LOCATION: THROAT

## 2023-03-22 NOTE — ED PROVIDER NOTES
Vitals:    03/22/23 0925   BP: 129/85   Pulse: (!) 105   Resp: 16   Temp: 97 °F (36.1 °C)   TempSrc: Temporal   SpO2: 97%       Medications - No data to display         PROCEDURES:  None    FINAL IMPRESSION      1. Uvulitis    2. Right otitis media, unspecified otitis media type          DISPOSITION/ PLAN     Strep test is negative at this time, however discussed with the patient that exam is consistent with uvulitis and right otitis media. I discussed the plan to treat with oral Omnicef and to use Tylenol and ibuprofen over-the-counter at home as needed. He is instructed to rest and hydrate and follow-up on an outpatient basis symptoms do not improve. He is agreeable to plan as discussed. PATIENT REFERRED TO:  ANALY Dominguez CNP  [address] [facility]  / [place] [address]      DISCHARGE MEDICATIONS:  New Prescriptions    CEFDINIR (OMNICEF) 300 MG CAPSULE    Take 1 capsule by mouth 2 times daily for 10 days       Discontinued Medications    MUPIROCIN (BACTROBAN) 2 % OINTMENT    Apply 1 each topically in the morning and at bedtime    NYSTATIN-TRIAMCINOLONE (MYCOLOG) 024103-1.1 UNIT/GM-% OINTMENT    Apply topically 2 times daily.        Current Discharge Medication List          Alissa Hartington, APRN - CNP    (Please note that portions of this note were completed with a voice recognition program. Efforts were made to edit the dictations but occasionally words are mis-transcribed.)           ANALY Chua CNP  03/22/23 0629

## 2023-03-22 NOTE — ED NOTES
Patient discharge instructions given to pt and pt verbalized understanding, 1 px given, no other needs at this time, and pt left in stable condition.      Gume Milton RN  03/22/23 9648

## 2023-07-25 ENCOUNTER — OFFICE VISIT (OUTPATIENT)
Dept: UROLOGY | Age: 23
End: 2023-07-25
Payer: COMMERCIAL

## 2023-07-25 VITALS — RESPIRATION RATE: 14 BRPM | HEIGHT: 71 IN | WEIGHT: 179.8 LBS | BODY MASS INDEX: 25.17 KG/M2

## 2023-07-25 DIAGNOSIS — N48.89 PENILE PAIN: Primary | ICD-10-CM

## 2023-07-25 DIAGNOSIS — N48.1 BALANITIS: ICD-10-CM

## 2023-07-25 PROCEDURE — 99214 OFFICE O/P EST MOD 30 MIN: CPT | Performed by: UROLOGY

## 2023-07-25 NOTE — PROGRESS NOTES
Patricia Gaston MD.    [address] [address]  Dept: [phone]  Dept Fax: [phone]  Loc: [facility] Urology Office Note -     Patient:  Marcelene Schwab  YOB: 2000    The patient is a 26 y.o. male who presents today for evaluation of the following problems:   Chief Complaint   Patient presents with    Follow-up     Follow-up in 3 months for symptom check and discussion regarding interest in elective circumcision. referred/consultation requested by ANALY Urban CNP. History of Present Illness:    Penile pain  For months- intermittent  Erythema and pain at tip of penis  Sharp pains in penis  Hx of chlamydia > 1 year ago  No hematuria  No testicular pain  Does do heavy lifting for work  +vape    balanitis  Uncircumcised  Pt not bothered by this          Requested/reviewed records from ANALY Urban CNP office and/or outside physician/EMR    (Patient's old records have been requested, reviewed and pertinent findings summarized in today's note.)    Procedures Today: N/A      Last several PSA's:  No results found for: PSA    Last total testosterone:  No results found for: TESTOSTERONE    Urinalysis today:  No results found for this visit on 07/25/23. Last BUN and creatinine:  No results found for: BUN  No results found for: CREATININE      Imaging Reviewed during this Office Visit:   Cheryl Erickson MD independently reviewed the images and verified the radiology reports from:    No results found. PAST MEDICAL, FAMILY AND SOCIAL HISTORY:  No past medical history on file. Past Surgical History:   Procedure Laterality Date    EYE SURGERY      NOSE SURGERY  2022     No family history on file.   Outpatient Medications Marked as Taking for the 7/25/23 encounter (Office Visit) with Maame Solis MD   Medication Sig Dispense Refill    fluticasone (FLONASE) 50 MCG/ACT nasal Bexarotene Pregnancy And Lactation Text: This medication is Pregnancy Category X and should not be given to women who are pregnant or may become pregnant. This medication should not be used if you are breast feeding.

## 2023-08-10 ENCOUNTER — PROCEDURE VISIT (OUTPATIENT)
Dept: UROLOGY | Age: 23
End: 2023-08-10
Payer: COMMERCIAL

## 2023-08-10 VITALS — WEIGHT: 179 LBS | BODY MASS INDEX: 25.06 KG/M2 | HEIGHT: 71 IN | RESPIRATION RATE: 14 BRPM

## 2023-08-10 DIAGNOSIS — G89.29 CHRONIC PELVIC PAIN IN MALE: Primary | ICD-10-CM

## 2023-08-10 DIAGNOSIS — R10.2 CHRONIC PELVIC PAIN IN MALE: Primary | ICD-10-CM

## 2023-08-10 DIAGNOSIS — N48.89 PENILE PAIN: Primary | ICD-10-CM

## 2023-08-10 DIAGNOSIS — N48.1 BALANITIS: ICD-10-CM

## 2023-08-10 PROCEDURE — 81003 URINALYSIS AUTO W/O SCOPE: CPT | Performed by: UROLOGY

## 2023-08-10 PROCEDURE — 52000 CYSTOURETHROSCOPY: CPT | Performed by: UROLOGY

## 2023-08-10 NOTE — PROGRESS NOTES
Cystoscopy    Operative Note    Patient:  Kamryn Orellana  MRN: [id number]  YOB: 2000    Date: 08/10/23  Surgeon: Orion Albarado MD  Anesthesia: Unk Armstrong  Indications:     Pt not bothered by phimosis and balanitis- mycolog prn    More bothered by pain in pelvis/testicle/perineum- offered cystoscopy. We have tried abx and more conservative measures  Needs pft likely but he seems hesistant      Position: Supine  EBL: 0 ml    Findings:   The patient was prepped and draped in the usual sterile fashion. The flexible cystoscope was advanced through the urethra and into the bladder. The bladder was thoroughly inspected and the following was noted:    Residual Urine: moderate. Urine clear, with no obvious infection  Urethra: No abnormalities of the urethra are noted. Urethral dilation was not performed. Prostate: open prostatic urethra with no obvious obstruction, intravesical extension of prostate not present. There was no previous TURP defect. Bladder: no tumor noted . Mild trabeculation noted. no bladder diverticulum. Ureters: Orifices with normal configuration and location. The cystoscope was removed. The patient tolerated the procedure well.   Recommend pelvic floor therapy for chronic pelvic pain  Does not need to see physician- no surgical issues

## 2023-09-21 ENCOUNTER — OFFICE VISIT (OUTPATIENT)
Dept: FAMILY MEDICINE CLINIC | Age: 23
End: 2023-09-21

## 2023-09-21 VITALS
WEIGHT: 182 LBS | SYSTOLIC BLOOD PRESSURE: 130 MMHG | RESPIRATION RATE: 14 BRPM | HEART RATE: 87 BPM | DIASTOLIC BLOOD PRESSURE: 66 MMHG | BODY MASS INDEX: 25.48 KG/M2 | OXYGEN SATURATION: 98 % | TEMPERATURE: 98.9 F | HEIGHT: 71 IN

## 2023-09-21 DIAGNOSIS — L98.9 SKIN LESION: Primary | ICD-10-CM

## 2023-09-21 DIAGNOSIS — J02.9 ACUTE PHARYNGITIS, UNSPECIFIED ETIOLOGY: ICD-10-CM

## 2023-09-21 DIAGNOSIS — E75.5 CHOLESTEROL DEPOSITION IN SKIN: ICD-10-CM

## 2023-09-21 ASSESSMENT — PATIENT HEALTH QUESTIONNAIRE - PHQ9
SUM OF ALL RESPONSES TO PHQ QUESTIONS 1-9: 0
1. LITTLE INTEREST OR PLEASURE IN DOING THINGS: 0
SUM OF ALL RESPONSES TO PHQ QUESTIONS 1-9: 0
2. FEELING DOWN, DEPRESSED OR HOPELESS: 0
SUM OF ALL RESPONSES TO PHQ9 QUESTIONS 1 & 2: 0

## 2023-09-21 NOTE — PATIENT INSTRUCTIONS
[address]   [address] [facility], [address] [place] [address]  [phone]

## 2023-09-21 NOTE — PROGRESS NOTES
in skin  -     Lipid, Fasting; Future    COVID, FLU and strep negative today  Continue supportive care  Call if no improvement next week. No follow-ups on file.         -Start above treatments  -Patient advised on conservative treatment including OTC meds  -Patient advised to contact our office immediately if symptoms worsen or persist    All patient questions answered. Patient voiced understanding.